# Patient Record
Sex: FEMALE | Race: WHITE | Employment: UNEMPLOYED | URBAN - METROPOLITAN AREA
[De-identification: names, ages, dates, MRNs, and addresses within clinical notes are randomized per-mention and may not be internally consistent; named-entity substitution may affect disease eponyms.]

---

## 2017-01-27 ENCOUNTER — OFFICE VISIT (OUTPATIENT)
Dept: FAMILY MEDICINE CLINIC | Age: 32
End: 2017-01-27

## 2017-01-27 VITALS
SYSTOLIC BLOOD PRESSURE: 126 MMHG | HEIGHT: 63 IN | WEIGHT: 170 LBS | HEART RATE: 86 BPM | BODY MASS INDEX: 30.12 KG/M2 | DIASTOLIC BLOOD PRESSURE: 70 MMHG | TEMPERATURE: 97.9 F | RESPIRATION RATE: 20 BRPM

## 2017-01-27 DIAGNOSIS — L03.213 PERIORBITAL CELLULITIS OF LEFT EYE: Primary | ICD-10-CM

## 2017-01-27 PROCEDURE — 99213 OFFICE O/P EST LOW 20 MIN: CPT | Performed by: FAMILY MEDICINE

## 2017-01-27 RX ORDER — SULFAMETHOXAZOLE AND TRIMETHOPRIM 800; 160 MG/1; MG/1
1 TABLET ORAL 2 TIMES DAILY
Qty: 20 TABLET | Refills: 0 | Status: SHIPPED | OUTPATIENT
Start: 2017-01-27 | End: 2017-02-06

## 2017-01-27 RX ORDER — CEPHALEXIN 500 MG/1
500 CAPSULE ORAL 4 TIMES DAILY
Qty: 40 CAPSULE | Refills: 0 | Status: SHIPPED | OUTPATIENT
Start: 2017-01-27 | End: 2017-02-06

## 2017-01-28 ENCOUNTER — PATIENT MESSAGE (OUTPATIENT)
Dept: FAMILY MEDICINE CLINIC | Age: 32
End: 2017-01-28

## 2017-02-08 ENCOUNTER — TELEPHONE (OUTPATIENT)
Dept: FAMILY MEDICINE CLINIC | Age: 32
End: 2017-02-08

## 2017-03-22 RX ORDER — LEVOTHYROXINE SODIUM 112 UG/1
TABLET ORAL
Qty: 90 TABLET | Refills: 0 | Status: SHIPPED | OUTPATIENT
Start: 2017-03-22 | End: 2017-06-26 | Stop reason: SDUPTHER

## 2017-03-22 RX ORDER — TRIAMTERENE AND HYDROCHLOROTHIAZIDE 37.5; 25 MG/1; MG/1
TABLET ORAL
Qty: 30 TABLET | Refills: 0 | Status: SHIPPED | OUTPATIENT
Start: 2017-03-22 | End: 2017-04-24 | Stop reason: SDUPTHER

## 2017-03-22 RX ORDER — METFORMIN HYDROCHLORIDE 500 MG/1
TABLET, EXTENDED RELEASE ORAL
Qty: 90 TABLET | Refills: 0 | Status: SHIPPED | OUTPATIENT
Start: 2017-03-22 | End: 2017-04-24 | Stop reason: SDUPTHER

## 2017-04-24 ENCOUNTER — TELEPHONE (OUTPATIENT)
Dept: FAMILY MEDICINE CLINIC | Age: 32
End: 2017-04-24

## 2017-04-25 ENCOUNTER — OFFICE VISIT (OUTPATIENT)
Dept: INTERNAL MEDICINE CLINIC | Age: 32
End: 2017-04-25

## 2017-04-25 VITALS
HEIGHT: 63 IN | TEMPERATURE: 98.1 F | SYSTOLIC BLOOD PRESSURE: 125 MMHG | DIASTOLIC BLOOD PRESSURE: 84 MMHG | HEART RATE: 96 BPM | BODY MASS INDEX: 30.83 KG/M2 | RESPIRATION RATE: 16 BRPM | OXYGEN SATURATION: 97 % | WEIGHT: 174 LBS

## 2017-04-25 DIAGNOSIS — J20.9 ACUTE BRONCHITIS, UNSPECIFIED ORGANISM: ICD-10-CM

## 2017-04-25 DIAGNOSIS — R05.9 COUGH: ICD-10-CM

## 2017-04-25 DIAGNOSIS — J02.9 SORE THROAT: Primary | ICD-10-CM

## 2017-04-25 LAB — S PYO AG THROAT QL: NORMAL

## 2017-04-25 PROCEDURE — 99214 OFFICE O/P EST MOD 30 MIN: CPT | Performed by: NURSE PRACTITIONER

## 2017-04-25 PROCEDURE — 87880 STREP A ASSAY W/OPTIC: CPT | Performed by: NURSE PRACTITIONER

## 2017-04-25 RX ORDER — ALBUTEROL SULFATE 90 UG/1
2 AEROSOL, METERED RESPIRATORY (INHALATION) EVERY 6 HOURS PRN
Qty: 1 INHALER | Refills: 0 | Status: SHIPPED | OUTPATIENT
Start: 2017-04-25 | End: 2020-01-25

## 2017-04-25 RX ORDER — TRIAMTERENE AND HYDROCHLOROTHIAZIDE 37.5; 25 MG/1; MG/1
TABLET ORAL
Qty: 30 TABLET | Refills: 3 | Status: SHIPPED | OUTPATIENT
Start: 2017-04-25 | End: 2017-08-23 | Stop reason: SDUPTHER

## 2017-04-25 RX ORDER — DICLOFENAC POTASSIUM 50 MG/1
50 TABLET, FILM COATED ORAL 3 TIMES DAILY
COMMUNITY
End: 2017-06-21

## 2017-04-25 RX ORDER — NAPROXEN SODIUM 550 MG/1
550 TABLET ORAL 2 TIMES DAILY WITH MEALS
COMMUNITY
End: 2017-06-21

## 2017-04-25 RX ORDER — HYDROXYZINE PAMOATE 25 MG/1
25 CAPSULE ORAL 3 TIMES DAILY PRN
COMMUNITY
End: 2020-01-25

## 2017-04-25 RX ORDER — AZITHROMYCIN 250 MG/1
TABLET, FILM COATED ORAL
Qty: 1 PACKET | Refills: 0 | Status: SHIPPED | OUTPATIENT
Start: 2017-04-25 | End: 2017-06-21

## 2017-04-25 RX ORDER — METFORMIN HYDROCHLORIDE 500 MG/1
TABLET, EXTENDED RELEASE ORAL
Qty: 90 TABLET | Refills: 3 | Status: SHIPPED | OUTPATIENT
Start: 2017-04-25 | End: 2017-08-25 | Stop reason: SDUPTHER

## 2017-04-25 RX ORDER — LEVETIRACETAM 250 MG/1
250 TABLET ORAL 2 TIMES DAILY
COMMUNITY
End: 2017-06-21

## 2017-04-25 RX ORDER — DEXTROMETHORPHAN HYDROBROMIDE AND PROMETHAZINE HYDROCHLORIDE 15; 6.25 MG/5ML; MG/5ML
5 SYRUP ORAL 4 TIMES DAILY PRN
Qty: 1 BOTTLE | Refills: 0 | Status: SHIPPED | OUTPATIENT
Start: 2017-04-25 | End: 2017-05-02

## 2017-04-25 RX ORDER — PROMETHAZINE HYDROCHLORIDE 25 MG/1
25 SUPPOSITORY RECTAL EVERY 6 HOURS PRN
COMMUNITY
End: 2020-08-19

## 2017-04-25 ASSESSMENT — ENCOUNTER SYMPTOMS
VOICE CHANGE: 1
VOMITING: 0
SORE THROAT: 1
DIARRHEA: 0
COUGH: 1
NAUSEA: 0

## 2017-06-05 RX ORDER — ERGOCALCIFEROL 1.25 MG/1
CAPSULE ORAL
Qty: 12 CAPSULE | Refills: 0 | Status: SHIPPED | OUTPATIENT
Start: 2017-06-05 | End: 2018-03-13 | Stop reason: SDUPTHER

## 2017-06-21 ENCOUNTER — OFFICE VISIT (OUTPATIENT)
Dept: FAMILY MEDICINE CLINIC | Age: 32
End: 2017-06-21

## 2017-06-21 VITALS
SYSTOLIC BLOOD PRESSURE: 114 MMHG | HEART RATE: 76 BPM | TEMPERATURE: 98.1 F | HEIGHT: 63 IN | WEIGHT: 176 LBS | BODY MASS INDEX: 31.18 KG/M2 | DIASTOLIC BLOOD PRESSURE: 70 MMHG | RESPIRATION RATE: 20 BRPM

## 2017-06-21 DIAGNOSIS — M79.7 FIBROMYALGIA: Primary | ICD-10-CM

## 2017-06-21 DIAGNOSIS — E03.9 ACQUIRED HYPOTHYROIDISM: ICD-10-CM

## 2017-06-21 DIAGNOSIS — I10 ESSENTIAL HYPERTENSION: ICD-10-CM

## 2017-06-21 DIAGNOSIS — E28.2 PCOS (POLYCYSTIC OVARIAN SYNDROME): ICD-10-CM

## 2017-06-21 DIAGNOSIS — M25.50 ARTHRALGIA, UNSPECIFIED JOINT: ICD-10-CM

## 2017-06-21 LAB
C-REACTIVE PROTEIN: 8.4 MG/L (ref 0–5.1)
SEDIMENTATION RATE, ERYTHROCYTE: 20 MM/HR (ref 0–20)
TSH SERPL DL<=0.05 MIU/L-ACNC: 2.61 UIU/ML (ref 0.27–4.2)

## 2017-06-21 PROCEDURE — 99214 OFFICE O/P EST MOD 30 MIN: CPT | Performed by: FAMILY MEDICINE

## 2017-06-21 PROCEDURE — 36415 COLL VENOUS BLD VENIPUNCTURE: CPT | Performed by: FAMILY MEDICINE

## 2017-06-21 RX ORDER — DICLOFENAC POTASSIUM 50 MG/1
50 TABLET, FILM COATED ORAL 2 TIMES DAILY PRN
Qty: 90 TABLET | COMMUNITY
Start: 2017-06-21 | End: 2020-01-25

## 2017-06-21 RX ORDER — LEVETIRACETAM 250 MG/1
250 TABLET ORAL 2 TIMES DAILY PRN
Qty: 60 TABLET | COMMUNITY
Start: 2017-06-21 | End: 2020-01-25 | Stop reason: SINTOL

## 2017-06-23 LAB
ANA INTERPRETATION: ABNORMAL
ANA PATTERN: ABNORMAL
ANA TITER: ABNORMAL
ANTI-NUCLEAR ANTIBODY (ANA): POSITIVE
PATHOLOGIST: ABNORMAL

## 2017-06-26 ENCOUNTER — TELEPHONE (OUTPATIENT)
Dept: FAMILY MEDICINE CLINIC | Age: 32
End: 2017-06-26

## 2017-06-26 RX ORDER — LEVOTHYROXINE SODIUM 112 UG/1
TABLET ORAL
Qty: 90 TABLET | Refills: 0 | Status: SHIPPED | OUTPATIENT
Start: 2017-06-26 | End: 2017-09-20 | Stop reason: SDUPTHER

## 2017-07-10 ENCOUNTER — TELEPHONE (OUTPATIENT)
Dept: INTERNAL MEDICINE CLINIC | Age: 32
End: 2017-07-10

## 2017-07-27 ENCOUNTER — OFFICE VISIT (OUTPATIENT)
Dept: FAMILY MEDICINE CLINIC | Age: 32
End: 2017-07-27

## 2017-07-27 VITALS
RESPIRATION RATE: 12 BRPM | BODY MASS INDEX: 30.82 KG/M2 | TEMPERATURE: 98.2 F | HEART RATE: 103 BPM | SYSTOLIC BLOOD PRESSURE: 110 MMHG | WEIGHT: 174 LBS | DIASTOLIC BLOOD PRESSURE: 70 MMHG

## 2017-07-27 DIAGNOSIS — H69.82 EUSTACHIAN TUBE DYSFUNCTION, LEFT: Primary | ICD-10-CM

## 2017-07-27 PROCEDURE — 99213 OFFICE O/P EST LOW 20 MIN: CPT | Performed by: NURSE PRACTITIONER

## 2017-07-27 RX ORDER — FLUTICASONE PROPIONATE 50 MCG
2 SPRAY, SUSPENSION (ML) NASAL DAILY
Qty: 1 BOTTLE | Refills: 0 | Status: SHIPPED | OUTPATIENT
Start: 2017-07-27 | End: 2020-01-25

## 2017-07-27 ASSESSMENT — ENCOUNTER SYMPTOMS
COUGH: 1
SINUS PRESSURE: 0
SORE THROAT: 1
VOMITING: 1
NAUSEA: 1
SHORTNESS OF BREATH: 0

## 2017-08-17 ENCOUNTER — OFFICE VISIT (OUTPATIENT)
Dept: RHEUMATOLOGY | Age: 32
End: 2017-08-17

## 2017-08-17 VITALS
HEART RATE: 101 BPM | SYSTOLIC BLOOD PRESSURE: 114 MMHG | OXYGEN SATURATION: 96 % | WEIGHT: 167.4 LBS | DIASTOLIC BLOOD PRESSURE: 80 MMHG | HEIGHT: 63 IN | BODY MASS INDEX: 29.66 KG/M2

## 2017-08-17 DIAGNOSIS — R76.8 POSITIVE ANA (ANTINUCLEAR ANTIBODY): ICD-10-CM

## 2017-08-17 DIAGNOSIS — M25.50 POLYARTHRALGIA: ICD-10-CM

## 2017-08-17 DIAGNOSIS — M79.7 FIBROMYALGIA: Primary | ICD-10-CM

## 2017-08-17 DIAGNOSIS — M79.7 FIBROMYALGIA: ICD-10-CM

## 2017-08-17 LAB
A/G RATIO: 1.6 (ref 1.1–2.2)
ALBUMIN SERPL-MCNC: 5 G/DL (ref 3.4–5)
ALP BLD-CCNC: 77 U/L (ref 40–129)
ALT SERPL-CCNC: 101 U/L (ref 10–40)
ANION GAP SERPL CALCULATED.3IONS-SCNC: 17 MMOL/L (ref 3–16)
AST SERPL-CCNC: 135 U/L (ref 15–37)
BASOPHILS ABSOLUTE: 0 K/UL (ref 0–0.2)
BASOPHILS RELATIVE PERCENT: 0.4 %
BILIRUB SERPL-MCNC: 0.6 MG/DL (ref 0–1)
BUN BLDV-MCNC: 9 MG/DL (ref 7–20)
CALCIUM SERPL-MCNC: 10.1 MG/DL (ref 8.3–10.6)
CHLORIDE BLD-SCNC: 96 MMOL/L (ref 99–110)
CO2: 27 MMOL/L (ref 21–32)
CREAT SERPL-MCNC: 0.9 MG/DL (ref 0.6–1.1)
EOSINOPHILS ABSOLUTE: 0.1 K/UL (ref 0–0.6)
EOSINOPHILS RELATIVE PERCENT: 1.9 %
GFR AFRICAN AMERICAN: >60
GFR NON-AFRICAN AMERICAN: >60
GLOBULIN: 3.1 G/DL
GLUCOSE BLD-MCNC: 83 MG/DL (ref 70–99)
HCT VFR BLD CALC: 47.1 % (ref 36–48)
HEMOGLOBIN: 16.2 G/DL (ref 12–16)
HEPATITIS B CORE IGM ANTIBODY: NORMAL
HEPATITIS B SURFACE ANTIGEN INTERPRETATION: NORMAL
HEPATITIS C ANTIBODY INTERPRETATION: NORMAL
LYMPHOCYTES ABSOLUTE: 1.9 K/UL (ref 1–5.1)
LYMPHOCYTES RELATIVE PERCENT: 26 %
MCH RBC QN AUTO: 30.4 PG (ref 26–34)
MCHC RBC AUTO-ENTMCNC: 34.3 G/DL (ref 31–36)
MCV RBC AUTO: 88.5 FL (ref 80–100)
MONOCYTES ABSOLUTE: 0.6 K/UL (ref 0–1.3)
MONOCYTES RELATIVE PERCENT: 8.6 %
NEUTROPHILS ABSOLUTE: 4.6 K/UL (ref 1.7–7.7)
NEUTROPHILS RELATIVE PERCENT: 63.1 %
PDW BLD-RTO: 12.2 % (ref 12.4–15.4)
PLATELET # BLD: 259 K/UL (ref 135–450)
PMV BLD AUTO: 9.2 FL (ref 5–10.5)
POTASSIUM SERPL-SCNC: 3.7 MMOL/L (ref 3.5–5.1)
RBC # BLD: 5.32 M/UL (ref 4–5.2)
RHEUMATOID FACTOR: <10 IU/ML
SODIUM BLD-SCNC: 140 MMOL/L (ref 136–145)
TOTAL CK: 155 U/L (ref 26–192)
TOTAL PROTEIN: 8.1 G/DL (ref 6.4–8.2)
VITAMIN B-12: 700 PG/ML (ref 211–911)
VITAMIN D 25-HYDROXY: 25.4 NG/ML
WBC # BLD: 7.4 K/UL (ref 4–11)

## 2017-08-17 PROCEDURE — 99244 OFF/OP CNSLTJ NEW/EST MOD 40: CPT | Performed by: INTERNAL MEDICINE

## 2017-08-18 DIAGNOSIS — R74.8 ELEVATED LIVER ENZYMES: Primary | ICD-10-CM

## 2017-08-18 DIAGNOSIS — R74.8 ELEVATED LIVER ENZYMES: ICD-10-CM

## 2017-08-18 DIAGNOSIS — M79.7 FIBROMYALGIA: ICD-10-CM

## 2017-08-18 LAB
ALBUMIN SERPL-MCNC: 4.8 G/DL (ref 3.4–5)
ALP BLD-CCNC: 73 U/L (ref 40–129)
ALT SERPL-CCNC: 92 U/L (ref 10–40)
AST SERPL-CCNC: 118 U/L (ref 15–37)
BILIRUB SERPL-MCNC: 0.6 MG/DL (ref 0–1)
BILIRUBIN DIRECT: <0.2 MG/DL (ref 0–0.3)
BILIRUBIN, INDIRECT: ABNORMAL MG/DL (ref 0–1)
ENA TO RNP ANTIBODY: NEGATIVE EU
ENA TO SMITH (SM) ANTIBODY: NEGATIVE EU
ENA TO SSA (RO) ANTIBODY: NEGATIVE EU
ENA TO SSB (LA) ANTIBODY: NEGATIVE EU
TOTAL PROTEIN: 7.9 G/DL (ref 6.4–8.2)

## 2017-08-19 LAB
CCP IGG ANTIBODIES: 5 UNITS (ref 0–19)
DOUBLE STRANDED DNA AB, IGG: NORMAL

## 2017-08-24 RX ORDER — TRIAMTERENE AND HYDROCHLOROTHIAZIDE 37.5; 25 MG/1; MG/1
TABLET ORAL
Qty: 30 TABLET | Refills: 5 | Status: SHIPPED | OUTPATIENT
Start: 2017-08-24 | End: 2018-02-27 | Stop reason: SDUPTHER

## 2017-09-19 ENCOUNTER — OFFICE VISIT (OUTPATIENT)
Dept: GYNECOLOGY | Age: 32
End: 2017-09-19

## 2017-09-19 VITALS
SYSTOLIC BLOOD PRESSURE: 127 MMHG | HEART RATE: 73 BPM | BODY MASS INDEX: 30.48 KG/M2 | DIASTOLIC BLOOD PRESSURE: 83 MMHG | TEMPERATURE: 97.6 F | WEIGHT: 172 LBS | HEIGHT: 63 IN

## 2017-09-19 DIAGNOSIS — Z01.419 WELL WOMAN EXAM WITH ROUTINE GYNECOLOGICAL EXAM: Primary | ICD-10-CM

## 2017-09-19 PROCEDURE — 99395 PREV VISIT EST AGE 18-39: CPT | Performed by: OBSTETRICS & GYNECOLOGY

## 2017-09-20 RX ORDER — LEVOTHYROXINE SODIUM 112 UG/1
TABLET ORAL
Qty: 90 TABLET | Refills: 1 | Status: SHIPPED | OUTPATIENT
Start: 2017-09-20 | End: 2020-01-27

## 2017-09-21 LAB
HPV COMMENT: ABNORMAL
HPV TYPE 16: NOT DETECTED
HPV TYPE 18: NOT DETECTED
HPVOH (OTHER TYPES): DETECTED

## 2017-10-16 ENCOUNTER — TELEPHONE (OUTPATIENT)
Dept: GYNECOLOGY | Age: 32
End: 2017-10-16

## 2017-10-16 NOTE — TELEPHONE ENCOUNTER
Called pt and lvm asking for call back to reschedule her colpo she originally cancelled on the 10th.

## 2017-10-19 ENCOUNTER — TELEPHONE (OUTPATIENT)
Dept: INTERNAL MEDICINE CLINIC | Age: 32
End: 2017-10-19

## 2017-10-19 NOTE — TELEPHONE ENCOUNTER
Pt just called. She is lost at [facility] looking for Dr. Harrison Saeed office. I advised she is on the [facility] side today at her [place] office. She was already almost 10 min late and stated it would take her about 20 min to get to that office. I did reschedule the pt for Tues. At the [facility] office.

## 2017-10-24 ENCOUNTER — OFFICE VISIT (OUTPATIENT)
Dept: RHEUMATOLOGY | Age: 32
End: 2017-10-24

## 2017-10-24 ENCOUNTER — TELEPHONE (OUTPATIENT)
Dept: INTERNAL MEDICINE CLINIC | Age: 32
End: 2017-10-24

## 2017-10-24 VITALS
DIASTOLIC BLOOD PRESSURE: 90 MMHG | HEIGHT: 63 IN | BODY MASS INDEX: 30.16 KG/M2 | WEIGHT: 170.2 LBS | SYSTOLIC BLOOD PRESSURE: 120 MMHG

## 2017-10-24 DIAGNOSIS — M79.7 FIBROMYALGIA: Primary | ICD-10-CM

## 2017-10-24 DIAGNOSIS — M25.50 POLYARTHRALGIA: ICD-10-CM

## 2017-10-24 DIAGNOSIS — R76.8 POSITIVE ANA (ANTINUCLEAR ANTIBODY): ICD-10-CM

## 2017-10-24 DIAGNOSIS — R74.8 ELEVATED LIVER ENZYMES: ICD-10-CM

## 2017-10-24 PROCEDURE — 99214 OFFICE O/P EST MOD 30 MIN: CPT | Performed by: INTERNAL MEDICINE

## 2017-10-24 PROCEDURE — G8484 FLU IMMUNIZE NO ADMIN: HCPCS | Performed by: INTERNAL MEDICINE

## 2017-10-24 PROCEDURE — 1036F TOBACCO NON-USER: CPT | Performed by: INTERNAL MEDICINE

## 2017-10-24 PROCEDURE — G8417 CALC BMI ABV UP PARAM F/U: HCPCS | Performed by: INTERNAL MEDICINE

## 2017-10-24 PROCEDURE — G8427 DOCREV CUR MEDS BY ELIG CLIN: HCPCS | Performed by: INTERNAL MEDICINE

## 2017-10-24 NOTE — PROGRESS NOTES
Vomiting     Projectile vomiting    Peanut-Containing Drug Products Anaphylaxis    Peanuts [Peanut Oil] Anaphylaxis    Macrobid [Nitrofurantoin Monohydrate Macrocrystals] Hives         Comments  No specialty comments available. Background history:  Amy Murillo is a 28 y.o. female who is being seen for follow up evaluation of  fibromyalgia, polyarthralgia and positive ESTUARDO. She is diagnosed with fibromyalgia for past many years. She is complaining of chronic widespread musculoskeletal pain involving upper and lower body on both sides of the midline. She describes her pain as constant, 7 out of 10, aching with no relieving or aggravating factors. She has tried multiple medications including gabapentin, Celebrex, Cymbalta, Elavil, Flexeril, tizanidine with no improvement. She is now on Lyrica 150 mg twice a day with mild improvement in symptoms. One and a half months ago she was started on Savella 12.5 mg twice a day but has not noticed much improvement in symptoms. She has also tried massage therapy, physical therapy, acupuncture and chiropractor in the past with no improvement in symptoms. Massage therapy made her symptoms worse. She also has a chronic headache and follows a neurologist at [place]. She has received trigger point injections for headache. She is also complaining of pain in the joints mainly in her hands which swell on and off. She also has a stiffness. She has persistent fatigue, insomnia, brain fog and memory loss. Other comorbidities include chronic migraine headaches and depression. She had a blood work by PCP which showed positive ESTUARDO 1:160 homogeneous pattern, slightly elevated CRP of 8.4. She has a significant family history of rheumatoid arthritis in her mother and grandmother. She has facial flushing, dry mouth, raynaud's, heart burn.   She denies any history of malar rash, photosensitivity, nasal or oral ulcers, dry eyes, pleurisy or pericarditis, alopecia, pregnancy complications, miscarriages, blood clots, renal diseases    Interim history: She presents for follow-up of fibromyalgia and polyarthralgia. She continues to be symptomatic. She is on Lyrica 150 mg twice a day and Savella 50 g once a day. She was not able to join the aquatic therapy, she is looking for the places that can take her insurance. She continues to have pain in the joints. She has intermittent swelling. Workup is negative for rheumatoid arthritis. She has elevated liver enzymes. She went to see Dr. Juve Hsu who ordered some blood work and recommended liver biopsy. HPI  ROS    REVIEW OF SYSTEMS:  positive for fatigue, raynauds, dry mouth, ringing in ears, chest pain, shortness of breath, persistent diarrhea, nausea, sexual difficulties, dizziness, muscle spasm, memory loss, night sweats, insomnia, swollen glands, increased susceptibility to infection, heart murmurs  Constitutional: No unanticipated weight loss or fevers. Integumentary: No rash, photosensitivity, malar rash, livedo reticularis, alopecia and sclerodactyly, skin tightening  Eyes: negative for dry eyes, visual disturbance and persistent redness, discharge from eyes   ENT: - No tinnitus, loss of hearing, vertigo, or recurrent ear infections.  - No history of nasal/oral ulcers. Cardiovascular: No history of pericarditis, palpitations  Respiratory: No cough or history of interstitial lung disease. No history of pleurisy. No history of tuberculosis or atypical infections. Gastrointestinal: No history of dysphagia or esophageal dysmotility. No inflammatory bowel disease. Genitourinary: No history renal disease, miscarriages. Hematologic/Lymphatic: No abnormal bruising or bleeding, blood clots or swollen lymph nodes. Neurological: No history seizure or focal weakness.  No history of neuropathies, paresthesias or hyperesthesias, facial droop, diplopia  Psychiatric: No history of bipolar disease, anxiety  Endocrine: Denies any thyroid / Tender Tender    Costochondral  + +   Low cervical + +   suboccipital + +   Trapezius  + +   Supraspinatus  + +   Lateral epicondyl + +   Gluteal + +   Greater trochanter  + +   knees + 0      Tenderness to palpation in multiple PIP, MCP joints  Ambulates without assistance, normal gait  Neck: Full ROM, no tenderness, supple   Upper Extremities        Shoulder: Full ROM, no crepitus        Elbow:  Full ROM, no synovitis, no deformity        Wrist: Full ROM, no synovitis, no deformity, no ulnar deviation        Fingers: No sclerodactly, no active raynaud's, no ulcers. MCPs: No synovitis, no deformity             PIPs:  No synovitis, no deformity             DIPs: No synovitis, no deformity             Nails: No pitting, no telangiectasias. Lower Extremities        Hip: Full ROM, no tenderness to palpation        Knee: Full ROM, no erythema/swelling/laxity/crepitus. Patella not ballotable. Ankle: Full ROM, no swelling or erythema        MTPs: No swelling or erythema  Back- no tenderness. Eyes:  PERRL, extra ocular movements intact, conjunctiva normal   HEENT:  Atraumatic, normocephalic, external ears normal, oropharynx moist, no pharyngeal exudates. Respiratory:  No respiratory distress  GI:  Soft, nondistended, normal bowel sounds, nontender, no organomegaly, no mass, no rebound, no guarding   :  No costovertebral angle tenderness   Integument:  Well hydrated, no rash or telangiectasias, facial flushing ++  Lymphatic:  No lymphadenopathy noted   Neurologic:   Alert & oriented x 3, CN 2-12 normal, no focal deficits noted. Sensations Intact. Muscle strength 5/5 proximally and distally in upper and lower extremities.    Psychiatric:  Speech and behavior appropriate           LABS AND IMAGING  Outside data reviewed and in HPI    Lab Results   Component Value Date    WBC 7.4 08/17/2017    RBC 5.32 08/17/2017    HGB 16.2 08/17/2017    HCT 47.1 08/17/2017     08/17/2017    MCV 88.5 08/17/2017    MCH 30.4 08/17/2017    MCHC 34.3 08/17/2017    RDW 12.2 08/17/2017    LYMPHOPCT 26.0 08/17/2017    MONOPCT 8.6 08/17/2017    BASOPCT 0.4 08/17/2017    MONOSABS 0.6 08/17/2017    LYMPHSABS 1.9 08/17/2017    EOSABS 0.1 08/17/2017    BASOSABS 0.0 08/17/2017       Chemistry        Component Value Date/Time     08/17/2017 1443    K 3.7 08/17/2017 1443    CL 96 (L) 08/17/2017 1443    CO2 27 08/17/2017 1443    BUN 9 08/17/2017 1443    CREATININE 0.9 08/17/2017 1443        Component Value Date/Time    CALCIUM 10.1 08/17/2017 1443    ALKPHOS 73 08/18/2017 1618     (H) 08/18/2017 1618    ALT 92 (H) 08/18/2017 1618    BILITOT 0.6 08/18/2017 1618          Lab Results   Component Value Date    SEDRATE 20 06/21/2017     Lab Results   Component Value Date    CRP 8.4 (H) 06/21/2017     Lab Results   Component Value Date    ESTUARDO POSITIVE 06/21/2017    C3 135 08/10/2015    C4 19 08/10/2015     Lab Results   Component Value Date    RF <10.0 08/17/2017    CCPABIGG 5 08/17/2017     Lab Results   Component Value Date    ESTUARDO POSITIVE 06/21/2017    ANATITER 1:160 06/21/2017    ANAINT see below 06/21/2017    ARELIS Henderson MD,PhD 06/21/2017     Lab Results   Component Value Date    DSDNAG None Detected 08/17/2017     Lab Results   Component Value Date    SSAROAB Negative 08/17/2017    SSALAAB Negative 08/17/2017     Lab Results   Component Value Date    SMAB Negative 08/17/2017    RNPAB Negative 08/17/2017     No results found for: CENTABIGG  Lab Results   Component Value Date    C3 135 08/10/2015    C4 19 08/10/2015     Lab Results   Component Value Date    VITD25 25.4 08/17/2017    FTY83ZOWTZ 1 08/10/2015     ASSESSMENT AND PLAN      Assessment/Plan:      ASSESSMENT:    1. Fibromyalgia    2. Polyarthralgia    3. Positive ESTUARDO (antinuclear antibody)    4. Elevated liver enzymes        PLAN:   Igor Birmingham was seen today for follow-up.     Diagnoses and all orders for this visit:    Carl rFances

## 2017-10-24 NOTE — PROGRESS NOTES
Vomiting     Projectile vomiting    Peanut-Containing Drug Products Anaphylaxis    Peanuts [Peanut Oil] Anaphylaxis    Macrobid [Nitrofurantoin Monohydrate Macrocrystals] Hives         Comments  No specialty comments available. HISTORY OF PRESENT ILLNESS  Chris Wells is a 28 y.o. female who is being seen for follow up evaluation of  fibromyalgia, polyarthralgia and positive ESTUARDO. She is diagnosed with fibromyalgia for past many years. She is complaining of chronic widespread musculoskeletal pain involving upper and lower body on both sides of the midline. She describes her pain as constant, 7 out of 10, aching with no relieving or aggravating factors. She has tried multiple medications including gabapentin, Celebrex, Cymbalta, Elavil, Flexeril, tizanidine with no improvement. She is now on Lyrica 150 mg twice a day with mild improvement in symptoms. One and a half months ago she was started on Savella 12.5 mg twice a day but has not noticed much improvement in symptoms. She has also tried massage therapy, physical therapy, acupuncture and chiropractor in the past with no improvement in symptoms. Massage therapy made her symptoms worse. She also has a chronic headache and follows a neurologist at [place]. She has received trigger point injections for headache. She is also complaining of pain in the joints mainly in her hands which swell on and off. She also has a stiffness. She has persistent fatigue, insomnia, brain fog and memory loss. Other comorbidities include chronic migraine headaches and depression. She had a blood work by PCP which showed positive ESTUARDO 1:160 homogeneous pattern, slightly elevated CRP of 8.4. She has a significant family history of rheumatoid arthritis in her mother and grandmother. She has facial flushing, dry mouth, raynaud's, heart burn.   She denies any history of malar rash, photosensitivity, nasal or oral ulcers, dry eyes, pleurisy or pericarditis, alopecia, (supraventricular tachycardia) ([facility] [address]     Thyroid disease     UTI (urinary tract infection)     as a child     Past Surgical History:   Procedure Laterality Date     SECTION  2009    twins    CHOLECYSTECTOMY  16    Laparoscopic cholecystectomy.  COLPOSCOPY      Dr Gabriela Barcenas- mild dysplasia ([facility] I)    SHOULDER ARTHROSCOPY      right (labral tear)    SHOULDER SURGERY      capsular repair    TONSILLECTOMY AND ADENOIDECTOMY      TUBAL LIGATION  2009     Social History     Social History    Marital status:      Spouse name: N/A    Number of children: N/A    Years of education: N/A     Occupational History    Not on file.      Social History Main Topics    Smoking status: Never Smoker    Smokeless tobacco: Never Used      Comment: congratulated on nonsmoking status    Alcohol use No    Drug use: No    Sexual activity: Yes     Partners: Male     Other Topics Concern    Not on file     Social History Narrative    No narrative on file     Family History   Problem Relation Age of Onset    Other Mother      RA, joint disease    High Blood Pressure Father     High Cholesterol Father          PHYSICAL EXAM   Vitals:    10/24/17 0914   BP: (!) 120/90   Weight: 170 lb 3.2 oz (77.2 kg)   Height: 5' 3\" (1.6 m)     Physical Exam  Constitutional:  Well developed, well nourished, no acute distress, non-toxic appearance   Musculoskeletal:                                           Right            Left                                   Tender Tender    Costochondral  + +   Low cervical + +   suboccipital + +   Trapezius  + +   Supraspinatus  + +   Lateral epicondyl + +   Gluteal + +   Greater trochanter  + +   knees + 0      Tenderness to palpation in multiple PIP, MCP joints  Ambulates without assistance, normal gait  Neck: Full ROM, no tenderness, supple   Upper Extremities        Shoulder: Full ROM, no crepitus        Elbow:  Full ROM, no synovitis, no deformity        Wrist: Full ROM, no synovitis, no deformity, no ulnar deviation        Fingers: No sclerodactly, no active raynaud's, no ulcers. MCPs: No synovitis, no deformity             PIPs:  No synovitis, no deformity             DIPs: No synovitis, no deformity             Nails: No pitting, no telangiectasias. Lower Extremities        Hip: Full ROM, no tenderness to palpation        Knee: Full ROM, no erythema/swelling/laxity/crepitus. Patella not ballotable. Ankle: Full ROM, no swelling or erythema        MTPs: No swelling or erythema  Back- no tenderness. Eyes:  PERRL, extra ocular movements intact, conjunctiva normal   HEENT:  Atraumatic, normocephalic, external ears normal, oropharynx moist, no pharyngeal exudates. Respiratory:  No respiratory distress  GI:  Soft, nondistended, normal bowel sounds, nontender, no organomegaly, no mass, no rebound, no guarding   :  No costovertebral angle tenderness   Integument:  Well hydrated, no rash or telangiectasias, facial flushing ++  Lymphatic:  No lymphadenopathy noted   Neurologic:   Alert & oriented x 3, CN 2-12 normal, no focal deficits noted. Sensations Intact. Muscle strength 5/5 proximally and distally in upper and lower extremities.    Psychiatric:  Speech and behavior appropriate           LABS AND IMAGING  Outside data reviewed and in HPI    Lab Results   Component Value Date    WBC 7.4 08/17/2017    RBC 5.32 08/17/2017    HGB 16.2 08/17/2017    HCT 47.1 08/17/2017     08/17/2017    MCV 88.5 08/17/2017    MCH 30.4 08/17/2017    MCHC 34.3 08/17/2017    RDW 12.2 08/17/2017    LYMPHOPCT 26.0 08/17/2017    MONOPCT 8.6 08/17/2017    BASOPCT 0.4 08/17/2017    MONOSABS 0.6 08/17/2017    LYMPHSABS 1.9 08/17/2017    EOSABS 0.1 08/17/2017    BASOSABS 0.0 08/17/2017       Chemistry        Component Value Date/Time     08/17/2017 1443    K 3.7 08/17/2017 1443    CL 96 (L) 08/17/2017 1443    CO2 27 08/17/2017 1443    BUN 9 08/17/2017 1443    CREATININE 0.9 management and physical therapy/aquatic therapy as well as self exercises. Went over various FDA approved (cymbalta, lyrica, gabapentin, sevalla) and non-fda approved medications (muscle relaxants, ssri's) with the patient. Written material was provided to the patient on fibromyalgia.   - Failed gabapentin, Cymbalta, Elavil, Flexeril, Zanaflex in the past  -Currently on Lyrica 150 mg twice a day, will continue  -Increase Savella to 50 mg twice a day  -Low-impact aerobic and stretching exercises including yoga, anabell chi, swimming, biking, walking  -Counseled on Sleep hygiene   -Drink 64 oz of water and limit caffeine intake to 8 oz/day   -I will check labs to rule out fibromyalgia mimics  -I will refer to aquatic therapy  -     CBC Auto Differential; Future  -     Comprehensive Metabolic Panel; Future  -     CK; Future  -     Vitamin B12; Future  -     Vitamin D 25 Hydroxy; Future  -     milnacipran HCl (SAVELLA) 25 MG TABS; Take 1 tablet by mouth 2 times daily  -     milnacipran HCl (SAVELLA) 50 MG TABS; Take 1 tablet by mouth daily  -     PT aquatic therapy; Future    Positive ESTUARDO (antinuclear antibody)-Implications of low titer positive ESTUARDO were discussed with patient. About 15-20% of normal healthy individuals at her age may have low titer positive ESTUARDO of unclear clinical significance.  -No evidence of connective tissue disease from history and physical  -I will check other serologies to complete the workup and continue to monitor periodically    -     Anti-DNA Antibody, Double-Stranded; Future  -     Lina 1 - anti smith & anti RNP; Future  -     LINA 2 - Anti SSA & Anti SSB; Future    Polyarthralgia- no synovitis in exam.  Pain most likely secondary to fibromyalgia/osteoarthritis. I will check labs to completely rule out rheumatoid arthritis. -     Cyclic Citrul Peptide Antibody, IgG; Future  -     Rheumatoid Factor; Future  -     Hepatitis B Core Antibody, IgM;  Future  -     Hepatitis B Surface Antigen; Future  -     Hepatitis C Antibody; Future     Time spent with the patient >60  minutes and 80% of the time spent with counseling on diagnosis and management, physical conditioning, sleep hygiene    The patient indicates understanding of these issues and agrees with the plan. No Follow-up on file. The risks and benefits of my recommendations, as well as other treatment options, benefits and side effects were discussed with the patient. All questions were answered. I reviewed patient's history, referral documents and electronic medical records  Copy of consult note is being routed electronically/faxed to referring physician         ######################################################################    I thank you for giving me the opportunity to participate in [address] [place] [address]. If you have any questions or concerns please feel free to contact me. I look forward to following  Celestina Saldaña along with you. Electronically signed by: Juni London MD, 10/24/2017 9:50 AM    Documentation was done using voice recognition dragon software. Every effort was made to ensure accuracy; however, inadvertent unintentional computerized transcription errors may be present.

## 2017-10-24 NOTE — TELEPHONE ENCOUNTER
Pharm calling-The savella also comes 50mg      Rx was for 25mg two twice a day #60    So if you want the 25mg the quantity will need to be increased.     Please advise

## 2017-10-24 NOTE — PATIENT INSTRUCTIONS
Increase savella to 50 mg twice a day   Aquatic therapy   Strongly emphasized on low impact aerobic and stretching exercises including biking, swimming, walking, yoga or tiachi classes    Counseled on Sleep hygiene    Advised to drink 64 oz of water and limit caffeine intake to 8 oz/day   -

## 2017-11-03 ENCOUNTER — HOSPITAL ENCOUNTER (OUTPATIENT)
Dept: CT IMAGING | Age: 32
Discharge: HOME | End: 2017-11-03
Attending: INTERNAL MEDICINE | Admitting: INTERNAL MEDICINE

## 2017-11-03 VITALS
RESPIRATION RATE: 16 BRPM | HEIGHT: 63 IN | SYSTOLIC BLOOD PRESSURE: 109 MMHG | BODY MASS INDEX: 29.23 KG/M2 | DIASTOLIC BLOOD PRESSURE: 67 MMHG | HEART RATE: 99 BPM | OXYGEN SATURATION: 96 % | TEMPERATURE: 96.9 F | WEIGHT: 165 LBS

## 2017-11-03 DIAGNOSIS — R74.8 ABNORMAL LEVELS OF OTHER SERUM ENZYMES: ICD-10-CM

## 2017-11-03 DIAGNOSIS — R74.8 ELEVATED LIVER ENZYMES: ICD-10-CM

## 2017-11-03 LAB
INR BLD: 1.11 (ref 0.85–1.15)
PLATELET # BLD: 222 K/UL (ref 135–450)
PROTHROMBIN TIME: 12.5 SEC (ref 9.6–13)

## 2017-11-03 RX ORDER — ONDANSETRON 2 MG/ML
4 INJECTION INTRAMUSCULAR; INTRAVENOUS EVERY 6 HOURS PRN
Status: DISCONTINUED | OUTPATIENT
Start: 2017-11-03 | End: 2017-11-04 | Stop reason: HOSPADM

## 2017-11-03 RX ORDER — ACETAMINOPHEN 325 MG/1
650 TABLET ORAL EVERY 4 HOURS PRN
Status: DISCONTINUED | OUTPATIENT
Start: 2017-11-03 | End: 2017-11-03 | Stop reason: ALTCHOICE

## 2017-11-03 RX ORDER — MIDAZOLAM HYDROCHLORIDE 1 MG/ML
INJECTION INTRAMUSCULAR; INTRAVENOUS DAILY PRN
Status: COMPLETED | OUTPATIENT
Start: 2017-11-03 | End: 2017-11-03

## 2017-11-03 RX ORDER — FENTANYL CITRATE 50 UG/ML
INJECTION, SOLUTION INTRAMUSCULAR; INTRAVENOUS DAILY PRN
Status: COMPLETED | OUTPATIENT
Start: 2017-11-03 | End: 2017-11-03

## 2017-11-03 RX ORDER — OXYCODONE HYDROCHLORIDE AND ACETAMINOPHEN 5; 325 MG/1; MG/1
2 TABLET ORAL EVERY 4 HOURS PRN
Status: DISCONTINUED | OUTPATIENT
Start: 2017-11-03 | End: 2017-11-04 | Stop reason: HOSPADM

## 2017-11-03 RX ORDER — ONDANSETRON 2 MG/ML
4 INJECTION INTRAMUSCULAR; INTRAVENOUS EVERY 8 HOURS PRN
Status: DISCONTINUED | OUTPATIENT
Start: 2017-11-03 | End: 2017-11-04 | Stop reason: HOSPADM

## 2017-11-03 RX ADMIN — ONDANSETRON 4 MG: 2 INJECTION INTRAMUSCULAR; INTRAVENOUS at 10:32

## 2017-11-03 RX ADMIN — FENTANYL CITRATE 100 MCG: 50 INJECTION, SOLUTION INTRAMUSCULAR; INTRAVENOUS at 10:10

## 2017-11-03 RX ADMIN — MIDAZOLAM HYDROCHLORIDE 2 MG: 1 INJECTION INTRAMUSCULAR; INTRAVENOUS at 10:10

## 2017-11-03 RX ADMIN — OXYCODONE HYDROCHLORIDE AND ACETAMINOPHEN 2 TABLET: 5; 325 TABLET ORAL at 11:02

## 2017-11-03 ASSESSMENT — PAIN SCALES - GENERAL
PAINLEVEL_OUTOF10: 9
PAINLEVEL_OUTOF10: 9

## 2017-11-03 ASSESSMENT — PAIN - FUNCTIONAL ASSESSMENT: PAIN_FUNCTIONAL_ASSESSMENT: 0-10

## 2017-11-03 NOTE — PROGRESS NOTES
Pt to phase II. Awake and alert. Grimacing in pain, rates 9/10. Provided snack prior to pain pill administration. VSS. Dressing with scant shadow drainage.

## 2017-11-03 NOTE — PRE SEDATION
Sedation Pre-Procedure Note    Patient Name: Author Santa   YOB: 1985  Room/Bed: Room/bed info not found  Medical Record Number: [id number]  Date: 11/3/2017   Time: 10:01 AM       Indication:  Liver bx    Consent: I have discussed with the patient and/or the patient representative the indication, alternatives, and the possible risks and/or complications of the planned procedure and the anesthesia methods. The patient and/or patient representative appear to understand and agree to proceed. Vital Signs:   Vitals:    17 0912   BP: (!) 143/86   Pulse: 93   Resp: 14   Temp: 98.1 °F (36.7 °C)   SpO2: 100%       Past Medical History:   has a past medical history of Depression; Fibromyalgia; Heart murmur; Hypertension; Insomnia; Migraine; Palpitations; Polycystic disease, ovaries; SVT (supraventricular tachycardia) ([facility] [address] Thyroid disease; and UTI (urinary tract infection). Past Surgical History:   has a past surgical history that includes Shoulder arthroscopy ();  section (); Tonsillectomy and adenoidectomy; shoulder surgery (); Tubal ligation (); Cholecystectomy (16); and Colposcopy (6/3/16). Medications:   Scheduled Meds:   Continuous Infusions:   PRN Meds:   Home Meds:   Prior to Admission medications    Medication Sig Start Date End Date Taking?  Authorizing Provider   milnacipran HCl (SAVELLA) 50 MG TABS Take 1 tablet by mouth 2 times daily 10/24/17  Yes Malgorzata Orosco MD   levothyroxine (SYNTHROID) 112 MCG tablet TAKE ONE TABLET BY MOUTH ONCE DAILY 17  Yes Tyson Corrales MD   triamterene-hydrochlorothiazide [facility]) 37.5-25 MG per tablet TAKE ONE TABLET BY MOUTH ONCE DAILY 17  Yes Tyson Corrales MD   LYRICA 150 MG capsule TAKE ONE CAPSULE BY MOUTH TWICE DAILY 17  Yes Tyson Corrales MD   diclofenac (CATAFLAM) 50 MG tablet Take 1 tablet by mouth 2 times daily as needed for Pain 17  Yes Tyson Corrales MD

## 2017-11-03 NOTE — PROGRESS NOTES
Pt feeling better. VSS. Pt states she is ready to go home, Discharge instructions provided, verbalized understanding.

## 2017-11-03 NOTE — PROGRESS NOTES
established preoperatively. 23.  The patient/caregiver demonstrates knowledge of the expected responses to the operative or invasive procedure. 20.  Patient/Caregiver has reduced anxiety. Interventions-Familiarize with environment and equipment.   21.  Other:  22.  Other:

## 2017-11-03 NOTE — PROGRESS NOTES
1. Identify name and date of birth. 2.  Patient remains free from signs and symptoms of injury. 3.  Patient receives appropriate medication(s), safely administered during the       Perioperative period. 4.  The patient is free from signs and symptoms of infection. 5.  The patient has wound/tissur perfusion. 6.  The patient's fluid, electrolyte, and acid-base balances are established perioperatively. 7.  The patient's pulmonary function is established preoperatively. 8.  The patient's cardiovascular status is established perioperatively. 9.  The patient/caregiver demonstrates knowledge of nutritional management related to the operative or other invasive procedure. 10. The patient/caregiver demonstrates knowledge of medication management. 11. The patient/caregiver demonstrates knowledge of pain management. 12.  The patient participates in the rehabilitation process as applicable. 13.  The patient/caregiver participates in decisions in decisions affecting his or her Perioperative plan of care. 14.  The patients care is consistent with the individualized Perioperative plan of care. 15.  The patients right to privacy is maintained. 16.  The patient is the recipient of competent and ethical care within legal standards of practice. 17.  The patient's value system, lifestyle, ethnicity, and culture are considered, respected, and incorporated int the perioperative plan of care and understands special services available. 18.  The patient demonstrates and/or reports adequate pain control throughout the perioperative period. 19. The patient's neurological status is established preoperatively. 20. The patient/caregiver demonstrates knowledge of the expected responses to the operative or invasive procedure. 21.  Patient/caregiver has reduced anxiety. Interventions - Familiarize with environment and equipment. 22.  Patient/caregiver verbalizes understanding of Phase I and Phase II process.   23.  Patient pain level is established preoperatively using age appropriate pain scale.

## 2017-12-22 ENCOUNTER — OFFICE VISIT (OUTPATIENT)
Dept: FAMILY MEDICINE CLINIC | Age: 32
End: 2017-12-22

## 2017-12-22 VITALS
RESPIRATION RATE: 12 BRPM | DIASTOLIC BLOOD PRESSURE: 82 MMHG | SYSTOLIC BLOOD PRESSURE: 126 MMHG | TEMPERATURE: 98.2 F | HEART RATE: 94 BPM | WEIGHT: 167 LBS | BODY MASS INDEX: 29.58 KG/M2

## 2017-12-22 DIAGNOSIS — I10 ESSENTIAL HYPERTENSION: ICD-10-CM

## 2017-12-22 DIAGNOSIS — E03.9 ACQUIRED HYPOTHYROIDISM: ICD-10-CM

## 2017-12-22 DIAGNOSIS — Z13.220 SCREENING, LIPID: ICD-10-CM

## 2017-12-22 DIAGNOSIS — K75.81 NASH (NONALCOHOLIC STEATOHEPATITIS): ICD-10-CM

## 2017-12-22 DIAGNOSIS — M79.7 FIBROMYALGIA: Primary | ICD-10-CM

## 2017-12-22 LAB
A/G RATIO: 1.6 (ref 1.1–2.2)
ALBUMIN SERPL-MCNC: 4.5 G/DL (ref 3.4–5)
ALP BLD-CCNC: 67 U/L (ref 40–129)
ALT SERPL-CCNC: 25 U/L (ref 10–40)
ANION GAP SERPL CALCULATED.3IONS-SCNC: 14 MMOL/L (ref 3–16)
AST SERPL-CCNC: 35 U/L (ref 15–37)
BILIRUB SERPL-MCNC: 0.6 MG/DL (ref 0–1)
BUN BLDV-MCNC: 12 MG/DL (ref 7–20)
CALCIUM SERPL-MCNC: 9.7 MG/DL (ref 8.3–10.6)
CHLORIDE BLD-SCNC: 99 MMOL/L (ref 99–110)
CHOLESTEROL, TOTAL: 186 MG/DL (ref 0–199)
CO2: 29 MMOL/L (ref 21–32)
CREAT SERPL-MCNC: 0.8 MG/DL (ref 0.6–1.1)
GFR AFRICAN AMERICAN: >60
GFR NON-AFRICAN AMERICAN: >60
GLOBULIN: 2.9 G/DL
GLUCOSE BLD-MCNC: 73 MG/DL (ref 70–99)
HDLC SERPL-MCNC: 48 MG/DL (ref 40–60)
LDL CHOLESTEROL CALCULATED: 113 MG/DL
POTASSIUM SERPL-SCNC: 3.6 MMOL/L (ref 3.5–5.1)
SODIUM BLD-SCNC: 142 MMOL/L (ref 136–145)
TOTAL PROTEIN: 7.4 G/DL (ref 6.4–8.2)
TRIGL SERPL-MCNC: 124 MG/DL (ref 0–150)
TSH SERPL DL<=0.05 MIU/L-ACNC: 0.38 UIU/ML (ref 0.27–4.2)
VLDLC SERPL CALC-MCNC: 25 MG/DL

## 2017-12-22 PROCEDURE — G8417 CALC BMI ABV UP PARAM F/U: HCPCS | Performed by: FAMILY MEDICINE

## 2017-12-22 PROCEDURE — G8427 DOCREV CUR MEDS BY ELIG CLIN: HCPCS | Performed by: FAMILY MEDICINE

## 2017-12-22 PROCEDURE — 1036F TOBACCO NON-USER: CPT | Performed by: FAMILY MEDICINE

## 2017-12-22 PROCEDURE — G8484 FLU IMMUNIZE NO ADMIN: HCPCS | Performed by: FAMILY MEDICINE

## 2017-12-22 PROCEDURE — 99214 OFFICE O/P EST MOD 30 MIN: CPT | Performed by: FAMILY MEDICINE

## 2017-12-22 NOTE — PROGRESS NOTES
Subjective:      Patient ID: Kojo Short is a 28 y.o. female. Blood pressure 126/82, pulse 94, temperature 98.2 °F (36.8 °C), temperature source Oral, resp. rate 12, weight 167 lb (75.8 kg), not currently breastfeeding. HPI  Here for f/u HTN, other things. Recently dx with CANSECO by Dr Sharlene Hamilton. On vit e. Working on diet/exercise. FM: saw dr Eusebia Celestin. On Savella, higher dose now. Does feel a bit better. She continues to have a 'racoon rash' but has had neg autoimmune work up. She still has suspicion, as does Dr Sharlene Hamilton, of autoimmune disease. Also on Lyrica. Still has bouts of fatigue, soreness, though. Worse in cold weather. Sees neurology: Dr Jose Angel Martin for migrianes. Getting botox injections. On keppra and prn diclofenac/phenergan. Also using vistaril nightly for prevention. Sees Dr Elvis Cook for sleep at [facility]. On cpap. He has discouraged her form pursuing UP3 surgery. Hypothyroid: on synthroid 112. Feels OK on this. Except for being tired. Thinks she feels better when her THS is lower, though. Lab Results   Component Value Date    TSH 2.61 06/21/2017      HTN: on triamterine/hctz. On this for several years. No hx of any vascular events. Has normal renal function   Lab Results   Component Value Date     08/17/2017    K 3.7 08/17/2017    BUN 9 08/17/2017    CREATININE 0.9 08/17/2017        She is fasting for lipids today. Patient Active Problem List   Diagnosis    Fibromyalgia    Insomnia    TBI (traumatic brain injury) ([facility] [address]    Hypothyroid- autoimmune; 3/15.  Post concussion syndrome    Post-concussion vertigo    Vitamin D deficiency-13ng/mL (3/24/15)    JAIMEE (obstructive sleep apnea)- on CPAP since 4/15 Dr Elvis Cook at [facility] hypertension    Chronic post-traumatic headache, not intractable    PCOS (polycystic ovarian syndrome) based on oligomenorrhea and high androgens    Cervical dysplasia, mild- colpo/bx 6/3/16      Body mass index is 29.58 kg/m².     Wt Jas Mckeon MD)  Documentation: No signs of potential drug abuse or diversion identified. Luis Eduardo Shafer MD)  Medication Contracts: Existing medication contract. Luis Eduardo Shafer MD)     2. Acquired hypothyroidism  - with symptoms of fatigue: recheck TSH level.  - TSH without Reflex; Future    3. Essential hypertension  - Blood pressure is at goal on current therapy; continue meds and monitoring. Regular physical activity and healthy diet (low sodium, multiple servings of produce daily) was recommended. Renal function to be assessed yearly.   - COMPREHENSIVE METABOLIC PANEL; Future    4. CANSECO (nonalcoholic steatohepatitis)- Dr Los Noriega on Vit E  - discussed need for dietary changes and weight loss, based on theory of hyperinsulinemia as a major driving force of steatohepatitis. 5. Screening, lipid  - Lipid Panel;  Future          Plan:      F/u 6 mo

## 2018-01-02 RX ORDER — METFORMIN HYDROCHLORIDE 500 MG/1
TABLET, EXTENDED RELEASE ORAL
Qty: 90 TABLET | Refills: 5 | Status: SHIPPED | OUTPATIENT
Start: 2018-01-02 | End: 2020-01-25

## 2018-01-04 RX ORDER — NITROGLYCERIN 0.4 MG/1
TABLET SUBLINGUAL
Qty: 25 TABLET | Refills: 3 | Status: SHIPPED | OUTPATIENT
Start: 2018-01-04 | End: 2020-01-25

## 2018-01-24 ENCOUNTER — OFFICE VISIT (OUTPATIENT)
Dept: RHEUMATOLOGY | Age: 33
End: 2018-01-24

## 2018-01-24 VITALS
SYSTOLIC BLOOD PRESSURE: 110 MMHG | WEIGHT: 172.4 LBS | DIASTOLIC BLOOD PRESSURE: 80 MMHG | HEIGHT: 63 IN | BODY MASS INDEX: 30.55 KG/M2

## 2018-01-24 DIAGNOSIS — M25.50 POLYARTHRALGIA: ICD-10-CM

## 2018-01-24 DIAGNOSIS — M79.7 FIBROMYALGIA: Primary | ICD-10-CM

## 2018-01-24 DIAGNOSIS — R74.8 ELEVATED LIVER ENZYMES: ICD-10-CM

## 2018-01-24 DIAGNOSIS — R76.8 POSITIVE ANA (ANTINUCLEAR ANTIBODY): ICD-10-CM

## 2018-01-24 PROCEDURE — 99214 OFFICE O/P EST MOD 30 MIN: CPT | Performed by: INTERNAL MEDICINE

## 2018-01-24 PROCEDURE — G8484 FLU IMMUNIZE NO ADMIN: HCPCS | Performed by: INTERNAL MEDICINE

## 2018-01-24 PROCEDURE — G8427 DOCREV CUR MEDS BY ELIG CLIN: HCPCS | Performed by: INTERNAL MEDICINE

## 2018-01-24 PROCEDURE — G8417 CALC BMI ABV UP PARAM F/U: HCPCS | Performed by: INTERNAL MEDICINE

## 2018-01-24 PROCEDURE — 1036F TOBACCO NON-USER: CPT | Performed by: INTERNAL MEDICINE

## 2018-01-24 RX ORDER — CYCLOBENZAPRINE HCL 5 MG
TABLET ORAL
Qty: 90 TABLET | Refills: 1 | Status: SHIPPED | OUTPATIENT
Start: 2018-01-24 | End: 2020-01-25

## 2018-01-24 NOTE — PATIENT INSTRUCTIONS
- cyclobenzaprine (FLEXERIL) 5 MG tablet; Take 1 or 2 tabs at night. Can take 1 tab in am if tolerated  Dispense: 90 tablet;  Refill: 1  -Continue lyrica and savella   -massage   Counseling

## 2018-01-24 NOTE — PROGRESS NOTES
2018  Patient Name: John Bauman  : 1985  Medical Record: [id number]    MEDICATIONS  Current Outpatient Prescriptions   Medication Sig Dispense Refill    cyclobenzaprine (FLEXERIL) 5 MG tablet Take 1 or 2 tabs at night. Can take 1 tab in am if tolerated 90 tablet 1    nitroGLYCERIN (NITROSTAT) 0.4 MG SL tablet PLACE ONE TABLET UNDER THE TONGUE ONCE DAILY AS NEEDED FOR CHEST PAIN.  MAY REPEAT IN 5 MINUTES IF NEEDED 25 tablet 3    metFORMIN (GLUCOPHAGE-XR) 500 MG extended release tablet TAKE THREE TABLETS BY MOUTH AT BEDTIME 90 tablet 5    LYRICA 150 MG capsule TAKE ONE CAPSULE BY MOUTH TWICE DAILY 60 capsule 2    milnacipran HCl (SAVELLA) 50 MG TABS Take 1 tablet by mouth 2 times daily 60 tablet 5    levothyroxine (SYNTHROID) 112 MCG tablet TAKE ONE TABLET BY MOUTH ONCE DAILY 90 tablet 1    triamterene-hydrochlorothiazide (MAXZIDE-25) 37.5-25 MG per tablet TAKE ONE TABLET BY MOUTH ONCE DAILY 30 tablet 5    fluticasone (FLONASE) 50 MCG/ACT nasal spray 2 sprays by Nasal route daily 1 Bottle 0    diclofenac (CATAFLAM) 50 MG tablet Take 1 tablet by mouth 2 times daily as needed for Pain 90 tablet     levETIRAcetam (KEPPRA) 250 MG tablet Take 1 tablet by mouth 2 times daily as needed 60 tablet     vitamin D (ERGOCALCIFEROL) 96001 UNITS CAPS capsule TAKE ONE CAPSULE BY MOUTH ONCE A WEEK 12 capsule 0    hydrOXYzine (VISTARIL) 25 MG capsule Take 25 mg by mouth 3 times daily as needed for Itching      promethazine (PHENERGAN) 25 MG suppository Place 25 mg rectally every 6 hours as needed for Nausea      albuterol sulfate HFA (PROVENTIL HFA) 108 (90 BASE) MCG/ACT inhaler Inhale 2 puffs into the lungs every 6 hours as needed for Wheezing 1 Inhaler 0    Ascorbic Acid (VITAMIN C) 250 MG CHEW Take by mouth 2 times daily      ferrous sulfate 325 (65 FE) MG tablet Take 325 mg by mouth 2 times daily (before meals)      Riboflavin (VITAMIN B-2 PO) Take 4 tablets by mouth every evening      EPINEPHrine (EPIPEN 2-NYLA) 0.3 MG/0.3ML SOAJ injection Inject 0.3 mLs into the muscle once as needed (anaphylaxis) 1 each 1     No current facility-administered medications for this visit. ALLERGIES  Allergies   Allergen Reactions    Hydrocodone-Acetaminophen Nausea And Vomiting     Projectile vomiting    Peanut-Containing Drug Products Anaphylaxis    Peanuts [Peanut Oil] Anaphylaxis    Macrobid [Nitrofurantoin Monohydrate Macrocrystals] Hives         Comments  No specialty comments available. Background history:  Merrick Byers is a 28 y.o. female who is being seen for follow up evaluation of  fibromyalgia, polyarthralgia and positive ESTUARDO. She is diagnosed with fibromyalgia for past many years. She is complaining of chronic widespread musculoskeletal pain involving upper and lower body on both sides of the midline. She describes her pain as constant, 7 out of 10, aching with no relieving or aggravating factors. She has tried multiple medications including gabapentin, Celebrex, Cymbalta, Elavil, Flexeril, tizanidine with no improvement. She is now on Lyrica 150 mg twice a day with mild improvement in symptoms. One and a half months ago she was started on Savella 12.5 mg twice a day but has not noticed much improvement in symptoms. She has also tried massage therapy, physical therapy, acupuncture and chiropractor in the past with no improvement in symptoms. Massage therapy made her symptoms worse. She also has a chronic headache and follows a neurologist at [facility]. She has received trigger point injections for headache. She is also complaining of pain in the joints mainly in her hands which swell on and off. She also has a stiffness. She has persistent fatigue, insomnia, brain fog and memory loss. Other comorbidities include chronic migraine headaches and depression. She had a blood work by PCP which showed positive ESTUARDO 1:160 homogeneous pattern, slightly elevated CRP of 8.4.   She has a significant family history of rheumatoid arthritis in her mother and grandmother. She has facial flushing, dry mouth, raynaud's, heart burn. She denies any history of malar rash, photosensitivity, nasal or oral ulcers, dry eyes, pleurisy or pericarditis, alopecia, pregnancy complications, miscarriages, blood clots, renal diseases    Interim history: She presents for follow-up of fibromyalgia and polyarthralgia. She continues to be symptomatic. She is on Lyrica 150 mg twice a day and Savella 50 Mg twice a day. She was not able to join the aquatic therapy, she is looking for the places that can take her insurance. She is doing yoga and Pilates almost every day. She continues to have pain in the joints. She has intermittent swelling. Workup is negative for rheumatoid arthritis. She has elevated liver enzymes. She went to see Dr. Griffin Haas she had liver biopsy which showed fatty liver. HPI  ROS    REVIEW OF SYSTEMS:  positive for fatigue, raynauds, dry mouth, ringing in ears, chest pain, shortness of breath, persistent diarrhea, nausea, sexual difficulties, dizziness, muscle spasm, memory loss, night sweats, insomnia, swollen glands, increased susceptibility to infection, heart murmurs  Constitutional: No unanticipated weight loss or fevers. Integumentary: No rash, photosensitivity, malar rash, livedo reticularis, alopecia and sclerodactyly, skin tightening  Eyes: negative for dry eyes, visual disturbance and persistent redness, discharge from eyes   ENT: - No tinnitus, loss of hearing, vertigo, or recurrent ear infections.  - No history of nasal/oral ulcers. Cardiovascular: No history of pericarditis, palpitations  Respiratory: No cough or history of interstitial lung disease. No history of pleurisy. No history of tuberculosis or atypical infections. Gastrointestinal: No history of dysphagia or esophageal dysmotility. No inflammatory bowel disease.   Genitourinary: No history renal disease, Cholesterol Father          PHYSICAL EXAM   Vitals:    01/24/18 1519   BP: 110/80   Weight: 172 lb 6.4 oz (78.2 kg)   Height: 5' 3\" (1.6 m)     Physical Exam  Constitutional:  Well developed, well nourished, no acute distress, non-toxic appearance   Musculoskeletal:                                           Right            Left                                   Tender Tender    Costochondral  + +   Low cervical + +   suboccipital + +   Trapezius  + +   Supraspinatus  + +   Lateral epicondyl + +   Gluteal + +   Greater trochanter  + +   knees + 0      Tenderness to palpation in multiple PIP, MCP joints  Ambulates without assistance, normal gait  Neck: Full ROM, no tenderness, supple   Upper Extremities        Shoulder: Full ROM, no crepitus        Elbow:  Full ROM, no synovitis, no deformity        Wrist: Full ROM, no synovitis, no deformity, no ulnar deviation        Fingers: No sclerodactly, no active raynaud's, no ulcers. MCPs: No synovitis, no deformity             PIPs:  No synovitis, no deformity             DIPs: No synovitis, no deformity             Nails: No pitting, no telangiectasias. Lower Extremities        Hip: Full ROM, no tenderness to palpation        Knee: Full ROM, no erythema/swelling/laxity/crepitus. Patella not ballotable. Ankle: Full ROM, no swelling or erythema        MTPs: No swelling or erythema  Back- no tenderness. Eyes:  PERRL, extra ocular movements intact, conjunctiva normal   HEENT:  Atraumatic, normocephalic, external ears normal, oropharynx moist, no pharyngeal exudates. Respiratory:  No respiratory distress  GI:  Soft, nondistended, normal bowel sounds, nontender, no organomegaly, no mass, no rebound, no guarding   :  No costovertebral angle tenderness   Integument:  Well hydrated, no rash or telangiectasias, facial flushing ++  Lymphatic:  No lymphadenopathy noted   Neurologic:   Alert & oriented x 3, CN 2-12 normal, no focal deficits noted.  Sensations 08/17/2017    [id number] 1 08/10/2015     ASSESSMENT AND PLAN      Assessment/Plan:      ASSESSMENT:    1. Fibromyalgia    2. Polyarthralgia    3. Positive ESTUARDO (antinuclear antibody)    4. Elevated liver enzymes        PLAN:   1. Fibromyalgia  Continues to be symptomatic  -Continue Lyrica 150 mg twice a day, Savella 50 mg  twice a day  -Referred to massage therapy   - continue yoga, low impact aerobic exercises  -Start Flexeril, side effects were discussed including excessive sedation, drowsiness, dry mouth. She was advised not to drive while taking Flexeril  - cyclobenzaprine (FLEXERIL) 5 MG tablet; Take 1 or 2 tabs at night. Can take 1 tab in am if tolerated  Dispense: 90 tablet; Refill: 6 - [id number]    2. Polyarthralgia  Symptoms most likely secondary to fibromyalgia/chronic pain. Workup negative for rheumatoid arthritis. 3. Positive ESTUARDO (antinuclear antibody)  Low titer positive ESTUARDO. Other serologies negative    4. Elevated liver enzymes  Liver biopsy consistent with fatty liver. Weight loss    Time spent with the patient 25 minutes and half of the time spent with counseling/coordination of care    The patient indicates understanding of these issues and agrees with the plan. Return in about 6 months (around 7/24/2018). The risks and benefits of my recommendations, as well as other treatment options, benefits and side effects were discussed with the patient. All questions were answered. ######################################################################    I thank you for giving me the opportunity to participate in [address] [place] [address]. If you have any questions or concerns please feel free to contact me. I look forward to following  Mac Ask along with you. Electronically signed by: Oziel Burgess MD, 1/24/2018 3:53 PM    Documentation was done using voice recognition dragon software.   Every effort was made to ensure accuracy; however, inadvertent

## 2018-01-29 ENCOUNTER — TELEPHONE (OUTPATIENT)
Dept: GYNECOLOGY | Age: 33
End: 2018-01-29

## 2018-02-05 ENCOUNTER — TELEPHONE (OUTPATIENT)
Dept: GYNECOLOGY | Age: 33
End: 2018-02-05

## 2018-02-27 RX ORDER — TRIAMTERENE AND HYDROCHLOROTHIAZIDE 37.5; 25 MG/1; MG/1
TABLET ORAL
Qty: 30 TABLET | Refills: 5 | Status: SHIPPED | OUTPATIENT
Start: 2018-02-27 | End: 2020-01-25

## 2018-02-28 ENCOUNTER — PATIENT MESSAGE (OUTPATIENT)
Dept: FAMILY MEDICINE CLINIC | Age: 33
End: 2018-02-28

## 2018-02-28 DIAGNOSIS — G89.29 CHRONIC NONINTRACTABLE HEADACHE, UNSPECIFIED HEADACHE TYPE: ICD-10-CM

## 2018-02-28 DIAGNOSIS — R51.9 CHRONIC NONINTRACTABLE HEADACHE, UNSPECIFIED HEADACHE TYPE: ICD-10-CM

## 2018-02-28 DIAGNOSIS — I10 ESSENTIAL HYPERTENSION: ICD-10-CM

## 2018-02-28 DIAGNOSIS — R61 EXCESSIVE SWEATING: Primary | ICD-10-CM

## 2018-03-08 DIAGNOSIS — G89.29 CHRONIC NONINTRACTABLE HEADACHE, UNSPECIFIED HEADACHE TYPE: ICD-10-CM

## 2018-03-08 DIAGNOSIS — R61 EXCESSIVE SWEATING: ICD-10-CM

## 2018-03-08 DIAGNOSIS — I10 ESSENTIAL HYPERTENSION: ICD-10-CM

## 2018-03-08 DIAGNOSIS — R51.9 CHRONIC NONINTRACTABLE HEADACHE, UNSPECIFIED HEADACHE TYPE: ICD-10-CM

## 2018-03-13 RX ORDER — ERGOCALCIFEROL 1.25 MG/1
CAPSULE ORAL
Qty: 12 CAPSULE | Refills: 1 | Status: SHIPPED | OUTPATIENT
Start: 2018-03-13 | End: 2020-01-25

## 2018-04-02 RX ORDER — LEVOTHYROXINE SODIUM 112 UG/1
TABLET ORAL
Qty: 30 TABLET | Refills: 5 | Status: SHIPPED | OUTPATIENT
Start: 2018-04-02 | End: 2020-01-25

## 2018-04-02 RX ORDER — EPINEPHRINE 0.3 MG/.3ML
INJECTION SUBCUTANEOUS
Qty: 2 EACH | Refills: 1 | Status: SHIPPED | OUTPATIENT
Start: 2018-04-02 | End: 2020-01-25

## 2018-05-29 ENCOUNTER — TELEPHONE (OUTPATIENT)
Dept: FAMILY MEDICINE CLINIC | Age: 33
End: 2018-05-29

## 2020-01-25 ENCOUNTER — OFFICE VISIT (OUTPATIENT)
Dept: FAMILY MEDICINE CLINIC | Age: 35
End: 2020-01-25
Payer: COMMERCIAL

## 2020-01-25 VITALS
HEIGHT: 63 IN | SYSTOLIC BLOOD PRESSURE: 110 MMHG | WEIGHT: 160 LBS | HEART RATE: 81 BPM | OXYGEN SATURATION: 98 % | BODY MASS INDEX: 28.35 KG/M2 | DIASTOLIC BLOOD PRESSURE: 76 MMHG

## 2020-01-25 LAB
A/G RATIO: 1.6 (ref 1.1–2.2)
ALBUMIN SERPL-MCNC: 4.4 G/DL (ref 3.4–5)
ALP BLD-CCNC: 51 U/L (ref 40–129)
ALT SERPL-CCNC: 10 U/L (ref 10–40)
ANION GAP SERPL CALCULATED.3IONS-SCNC: 10 MMOL/L (ref 3–16)
AST SERPL-CCNC: 16 U/L (ref 15–37)
BILIRUB SERPL-MCNC: 0.4 MG/DL (ref 0–1)
BUN BLDV-MCNC: 9 MG/DL (ref 7–20)
CALCIUM SERPL-MCNC: 9.5 MG/DL (ref 8.3–10.6)
CHLORIDE BLD-SCNC: 101 MMOL/L (ref 99–110)
CHOLESTEROL, TOTAL: 163 MG/DL (ref 0–199)
CO2: 27 MMOL/L (ref 21–32)
CREAT SERPL-MCNC: 0.8 MG/DL (ref 0.6–1.1)
GFR AFRICAN AMERICAN: >60
GFR NON-AFRICAN AMERICAN: >60
GLOBULIN: 2.7 G/DL
GLUCOSE BLD-MCNC: 95 MG/DL (ref 70–99)
HDLC SERPL-MCNC: 56 MG/DL (ref 40–60)
LDL CHOLESTEROL CALCULATED: 91 MG/DL
POTASSIUM SERPL-SCNC: 4.4 MMOL/L (ref 3.5–5.1)
SODIUM BLD-SCNC: 138 MMOL/L (ref 136–145)
TOTAL PROTEIN: 7.1 G/DL (ref 6.4–8.2)
TRIGL SERPL-MCNC: 80 MG/DL (ref 0–150)
TSH SERPL DL<=0.05 MIU/L-ACNC: 2.58 UIU/ML (ref 0.27–4.2)
VLDLC SERPL CALC-MCNC: 16 MG/DL

## 2020-01-25 PROCEDURE — 1036F TOBACCO NON-USER: CPT | Performed by: FAMILY MEDICINE

## 2020-01-25 PROCEDURE — G8484 FLU IMMUNIZE NO ADMIN: HCPCS | Performed by: FAMILY MEDICINE

## 2020-01-25 PROCEDURE — 36415 COLL VENOUS BLD VENIPUNCTURE: CPT | Performed by: FAMILY MEDICINE

## 2020-01-25 PROCEDURE — 99215 OFFICE O/P EST HI 40 MIN: CPT | Performed by: FAMILY MEDICINE

## 2020-01-25 PROCEDURE — G8427 DOCREV CUR MEDS BY ELIG CLIN: HCPCS | Performed by: FAMILY MEDICINE

## 2020-01-25 PROCEDURE — G8419 CALC BMI OUT NRM PARAM NOF/U: HCPCS | Performed by: FAMILY MEDICINE

## 2020-01-25 RX ORDER — EPINEPHRINE 0.3 MG/.3ML
0.3 INJECTION SUBCUTANEOUS
Qty: 1 EACH | Refills: 1 | Status: SHIPPED | OUTPATIENT
Start: 2020-01-25 | End: 2021-01-09 | Stop reason: SDUPTHER

## 2020-01-25 RX ORDER — ZOLPIDEM TARTRATE 10 MG/1
10 TABLET ORAL NIGHTLY PRN
Qty: 30 TABLET | Refills: 2 | Status: SHIPPED | OUTPATIENT
Start: 2020-01-25 | End: 2020-04-22

## 2020-01-25 RX ORDER — HYDROXYZINE PAMOATE 25 MG/1
25 CAPSULE ORAL NIGHTLY
Qty: 30 CAPSULE | Refills: 3 | Status: SHIPPED | OUTPATIENT
Start: 2020-01-25 | End: 2020-05-26

## 2020-01-25 ASSESSMENT — PATIENT HEALTH QUESTIONNAIRE - PHQ9
SUM OF ALL RESPONSES TO PHQ QUESTIONS 1-9: 1
1. LITTLE INTEREST OR PLEASURE IN DOING THINGS: 0
2. FEELING DOWN, DEPRESSED OR HOPELESS: 1
SUM OF ALL RESPONSES TO PHQ9 QUESTIONS 1 & 2: 1
SUM OF ALL RESPONSES TO PHQ QUESTIONS 1-9: 1

## 2020-01-25 NOTE — PROGRESS NOTES
over-extended, taking time for rest.  Sleep is 'not good' meaning that she has difficulty falling and staying asleep. Is not using CPAP. Is stored away somewhere and is currently lost.  It was helpful. She wishes to re-connect with Dr Ashleigh Bond. ~4-5 hrs of sleep nightly. Was on zolpidem 7.5 mg prn while in [facility]. Used it about 3x a week or less. She would never have migraines after using ambien and sleeping more soundly. Synthroid dose currently 100 mcg. Last TSH unknown. Patient Active Problem List   Diagnosis    Fibromyalgia    Insomnia    TBI (traumatic brain injury) ([facility] [address]    Hypothyroid- autoimmune; 3/15.  Vitamin D deficiency-13ng/mL (3/24/15)    JAIMEE (obstructive sleep apnea)- on CPAP since 4/15 Dr Ashleigh Bond at [place] [id number] hypertension    Chronic post-traumatic headache, not intractable    PCOS (polycystic ovarian syndrome) based on oligomenorrhea and high androgens    Cervical dysplasia, mild- colpo/bx 6/3/16    CANSECO (nonalcoholic steatohepatitis)- Dr Lambert Saliva on Vit E      Body mass index is 28.34 kg/m².     Wt Readings from Last 3 Encounters:   01/25/20 160 lb (72.6 kg)   01/24/18 172 lb 6.4 oz (78.2 kg)   12/22/17 167 lb (75.8 kg)      BP Readings from Last 3 Encounters:   01/25/20 110/76   01/24/18 110/80   12/22/17 126/82      Current Outpatient Medications   Medication Sig Dispense Refill    levothyroxine (SYNTHROID) 112 MCG tablet TAKE ONE TABLET BY MOUTH ONCE DAILY 90 tablet 1    levETIRAcetam (KEPPRA) 250 MG tablet Take 1 tablet by mouth 2 times daily as needed 60 tablet     promethazine (PHENERGAN) 25 MG suppository Place 25 mg rectally every 6 hours as needed for Nausea      Ascorbic Acid (VITAMIN C) 250 MG CHEW Take by mouth 2 times daily      Riboflavin (VITAMIN B-2 PO) Take 4 tablets by mouth every evening      hydrOXYzine (VISTARIL) 25 MG capsule Take 25 mg by mouth 3 times daily as needed for Itching       No current facility-administered medications for this post-traumatic headache, not intractable  - discussed headache prevention- starting verapail, use vistaril and ambien nightly to promote good sleep nightly. (offered and discussed CBT with therapist also for future consideration). She will also contact Dr Christian Trevino for resumption of CPAP. - thought to be migraines also, but avoid triptans due to prior hypertensive emergency. 5. Primary insomnia  - As above. Will have her use ambien nightly for migraine and headache prevention  - zolpidem (AMBIEN) 10 MG tablet; Take 1 tablet by mouth nightly as needed for Sleep for up to 90 days. Dispense: 30 tablet; Refill: 2    6. Fibromyalgia  - no current meds for this, except to promote better sleep    7. Screening, lipid  - Lipid Panel          Plan:      Fu 2 mo due to bp med change.         Garrison Saldaña MD

## 2020-01-27 RX ORDER — LEVOTHYROXINE SODIUM 0.1 MG/1
100 TABLET ORAL DAILY
Qty: 90 TABLET | Refills: 3 | Status: SHIPPED | OUTPATIENT
Start: 2020-01-27 | End: 2021-05-04

## 2020-02-08 ENCOUNTER — PATIENT MESSAGE (OUTPATIENT)
Dept: FAMILY MEDICINE CLINIC | Age: 35
End: 2020-02-08

## 2020-02-10 RX ORDER — PENICILLIN V POTASSIUM 500 MG/1
500 TABLET ORAL 4 TIMES DAILY
Qty: 40 TABLET | Refills: 0 | Status: SHIPPED | OUTPATIENT
Start: 2020-02-10 | End: 2020-02-20

## 2020-05-01 ENCOUNTER — VIRTUAL VISIT (OUTPATIENT)
Dept: FAMILY MEDICINE CLINIC | Age: 35
End: 2020-05-01
Payer: COMMERCIAL

## 2020-05-01 PROCEDURE — G8427 DOCREV CUR MEDS BY ELIG CLIN: HCPCS | Performed by: FAMILY MEDICINE

## 2020-05-01 PROCEDURE — 99214 OFFICE O/P EST MOD 30 MIN: CPT | Performed by: FAMILY MEDICINE

## 2020-05-01 RX ORDER — SUMATRIPTAN 100 MG/1
100 TABLET, FILM COATED ORAL
Qty: 9 TABLET | Refills: 5 | Status: SHIPPED | OUTPATIENT
Start: 2020-05-01 | End: 2020-05-01

## 2020-05-01 NOTE — PROGRESS NOTES
Prior to Visit Medications    Medication Sig Taking? Authorizing Provider   verapamil (CALAN SR) 180 MG extended release tablet Take 1 tablet by mouth nightly Yes Chano Mojica MD   SUMAtriptan (IMITREX) 100 MG tablet Take 1 tablet by mouth once as needed for Migraine Yes Chano Mojica MD   zolpidem (AMBIEN) 10 MG tablet TAKE 1 TABLET BY MOUTH NIGHTLY AS NEEDED FOR SLEEP Yes Chano Mojica MD   levothyroxine (SYNTHROID) 100 MCG tablet Take 1 tablet by mouth Daily Yes Chano Mojica MD   EPINEPHrine (EPIPEN 2-NYLA) 0.3 MG/0.3ML SOAJ injection Inject 0.3 mLs into the muscle once as needed (anaphylaxis) Yes Chano Mojica MD   hydrOXYzine (VISTARIL) 25 MG capsule Take 1 capsule by mouth nightly Yes Chano Mojica MD   promethazine (PHENERGAN) 25 MG suppository Place 25 mg rectally every 6 hours as needed for Nausea Yes Historical Provider, MD   Ascorbic Acid (VITAMIN C) 250 MG CHEW Take by mouth 2 times daily Yes Historical Provider, MD   Riboflavin (VITAMIN B-2 PO) Take 4 tablets by mouth every evening Yes Historical Provider, MD       Social History     Tobacco Use    Smoking status: Never Smoker    Smokeless tobacco: Never Used    Tobacco comment: congratulated on nonsmoking status   Substance Use Topics    Alcohol use: No     Alcohol/week: 0.0 standard drinks    Drug use: No            PHYSICAL EXAMINATION:  Physical Exam  Constitutional:       General: She is not in acute distress. Appearance: Normal appearance. She is not ill-appearing. Pulmonary:      Effort: Pulmonary effort is normal.   Skin:     General: Skin is warm. Neurological:      General: No focal deficit present. Mental Status: She is alert and oriented to person, place, and time. Mental status is at baseline. Psychiatric:         Mood and Affect: Mood normal.         Behavior: Behavior normal.         Thought Content:  Thought content normal.         Judgment: Judgment normal. substitute for in-person clinic visit. Patient and provider were located at their individual homes. --Sarahi Mcclellan MD on 5/1/2020 at 2:47 PM    An electronic signature was used to authenticate this note.

## 2020-05-01 NOTE — LETTER
[facility] [address] [address]  Phone: [phone]  Fax: [phone]    Bhargav Painter MD        May 1, 2020     Patient: Hudson Swain   YOB: 1985   Date of Visit: 5/1/2020       To Whom It May Concern: It is my medical opinion that Darek Green does not tolerate prolonged mask wearing due to underlying medical problems. If you have any questions or concerns, please don't hesitate to call.     Sincerely,        Bhargav Painter MD

## 2020-05-06 ENCOUNTER — TELEPHONE (OUTPATIENT)
Dept: FAMILY MEDICINE CLINIC | Age: 35
End: 2020-05-06

## 2020-05-26 RX ORDER — HYDROXYZINE PAMOATE 25 MG/1
25 CAPSULE ORAL NIGHTLY
Qty: 30 CAPSULE | Refills: 3 | Status: SHIPPED | OUTPATIENT
Start: 2020-05-26 | End: 2020-10-05

## 2020-08-12 ENCOUNTER — TELEPHONE (OUTPATIENT)
Dept: FAMILY MEDICINE CLINIC | Age: 35
End: 2020-08-12

## 2020-08-12 NOTE — TELEPHONE ENCOUNTER
Pt was exposed to someone that is positive for covid yesterday the pt is not experiencing no symptoms but is staying in for 14 days and is working from home   Pt is scheduled for a vv next week FYI   Pt wanted hr  to know

## 2020-08-19 ENCOUNTER — TELEMEDICINE (OUTPATIENT)
Dept: FAMILY MEDICINE CLINIC | Age: 35
End: 2020-08-19
Payer: COMMERCIAL

## 2020-08-19 PROCEDURE — G8427 DOCREV CUR MEDS BY ELIG CLIN: HCPCS | Performed by: FAMILY MEDICINE

## 2020-08-19 PROCEDURE — 99214 OFFICE O/P EST MOD 30 MIN: CPT | Performed by: FAMILY MEDICINE

## 2020-08-19 RX ORDER — ONDANSETRON 8 MG/1
8 TABLET, ORALLY DISINTEGRATING ORAL EVERY 8 HOURS PRN
Qty: 30 TABLET | Refills: 1 | Status: SHIPPED | OUTPATIENT
Start: 2020-08-19

## 2020-08-19 RX ORDER — VERAPAMIL HYDROCHLORIDE 240 MG/1
240 TABLET, FILM COATED, EXTENDED RELEASE ORAL NIGHTLY
Qty: 30 TABLET | Refills: 3 | Status: SHIPPED | OUTPATIENT
Start: 2020-08-19 | End: 2020-12-28 | Stop reason: ALTCHOICE

## 2020-08-19 NOTE — PROGRESS NOTES
2020    TELEHEALTH EVALUATION -- Audio/Visual (During SLFEX-74 public health emergency)    HPI:    Jewel Flores (:  1985) has requested an audio/video evaluation for the following concern(s):    Chief Complaint   Patient presents with    Other     exposed to covid - sent home 20 from work       Juan Conroy says she had a close contact at work with someone who tested positive. She is currently asymptomatic. The exposure was masked, close proximity (<6 feet) but not more than 10 min. Another co-worker had exposure and symptoms but tested negative. She does report having stuffy nose, slight sore throat and loose stools the past 3 days. No fever or cough. Is in [place], [place] currently. HTN: taking veramapil 180. Denies SE's. No constipation. Also used for migraine prophylaxis. Home bps: (done at work) 185/96, but that was not a resting bp. Has noted higher bp's like this the past 3 weeks. BP Readings from Last 3 Encounters:   20 110/76   18 110/80   17 126/82      Current migraine headache frequency: ~2/week still. Imitrex does work pretty well. But causes nausea. Hypothyroid: on synthroid 100. Last TSH stable. Feels OK on this. Lab Results   Component Value Date    TSH 2.58 2020    TSH 0.38 2017    TSH 2.61 2017        Review of Systems No chest pains, dizziness, heart palpitations, dyspnea, lightheadedness, worsening edema. Patient Active Problem List   Diagnosis    Fibromyalgia    Insomnia    TBI (traumatic brain injury) ([facility] [address]    Hypothyroid- autoimmune; 3/15.     Vitamin D deficiency-13ng/mL (3/24/15)    JAIMEE (obstructive sleep apnea)- on CPAP since 4/15 Dr Marion Kennedy at [facility] 21 hypertension    Chronic post-traumatic headache, not intractable    PCOS (polycystic ovarian syndrome) based on oligomenorrhea and high androgens    Cervical dysplasia, mild- colpo/bx 6/3/16    CANSECO (nonalcoholic steatohepatitis)- Dr Nick Hannon on Vit E Prior to Visit Medications    Medication Sig Taking? Authorizing Provider   zolpidem (AMBIEN) 10 MG tablet TAKE 1 TABLET BY MOUTH NIGHTLY AS NEEDED FOR SLEEP  Annika Riddle MD   hydrOXYzine (VISTARIL) 25 MG capsule TAKE 1 CAPSULE BY MOUTH NIGHTLY  Annika Riddle MD   verapamil (CALAN SR) 180 MG extended release tablet Take 1 tablet by mouth nightly  Annika Riddle MD   SUMAtriptan (IMITREX) 100 MG tablet Take 1 tablet by mouth once as needed for Migraine  Annika Riddle MD   levothyroxine (SYNTHROID) 100 MCG tablet Take 1 tablet by mouth Daily  Annika Riddle MD   EPINEPHrine (EPIPEN 2-NYLA) 0.3 MG/0.3ML SOAJ injection Inject 0.3 mLs into the muscle once as needed (anaphylaxis)  Annika Riddle MD   Ascorbic Acid (VITAMIN C) 250 MG CHEW Take by mouth 2 times daily  Historical Provider, MD   Riboflavin (VITAMIN B-2 PO) Take 4 tablets by mouth every evening  Historical Provider, MD       Social History     Tobacco Use    Smoking status: Never Smoker    Smokeless tobacco: Never Used    Tobacco comment: congratulated on nonsmoking status   Substance Use Topics    Alcohol use: No     Alcohol/week: 0.0 standard drinks    Drug use: No            PHYSICAL EXAMINATION:  Physical Exam  Constitutional:       General: She is not in acute distress. Appearance: Normal appearance. She is not ill-appearing. Pulmonary:      Effort: Pulmonary effort is normal.   Skin:     General: Skin is warm. Neurological:      General: No focal deficit present. Mental Status: She is alert and oriented to person, place, and time. Mental status is at baseline. Psychiatric:         Mood and Affect: Mood normal.         Behavior: Behavior normal.         Thought Content: Thought content normal.         Judgment: Judgment normal.          ASSESSMENT/PLAN:    1. Essential hypertension  Home bp's high and migraines still not well controlled. Will increase verapamil to 240.   Recheck in 6 weeks in office. 2. Close exposure to COVID-19 virus  - advised to go get covid test done. Her mild sx could very well indicate a current infection. 3. Intractable chronic migraine without aura and without status migrainosus  - incr verapamil to 240.  - continue nightly ambien for sleep (she needs the 10 mg dose)  - add zofran to take 20 min prior to imitrex to help prevent emesis. Return in about 6 weeks (around 9/30/2020) for follow up Hypertension, in office visit. Damari Robison is a 29 y.o. female being evaluated by a Virtual Visit (video visit) encounter to address concerns as mentioned above. A caregiver was present when appropriate. Due to this being a TeleHealth encounter (During [facility] public health emergency), evaluation of the following organ systems was limited: Vitals/Constitutional/EENT/Resp/CV/GI//MS/Neuro/Skin/Heme-Lymph-Imm. Pursuant to the emergency declaration under the [address] [place] [address], [address] [place] [address] authority and the [employer] and [employer] Act, this Virtual Visit was conducted with patient's (and/or legal guardian's) consent, to reduce the patient's risk of exposure to COVID-19 and provide necessary medical care. The patient (and/or legal guardian) has also been advised to contact this office for worsening conditions or problems, and seek emergency medical treatment and/or call 911 if deemed necessary. Patient identification was verified at the start of the visit: Yes    Total time spent on this encounter: 21 or more minutes were spent on the digital evaluation and management of this patient. Services were provided through a video synchronous discussion virtually to substitute for in-person clinic visit. Patient and provider were located at their individual homes. --Saul Cruz MD on 8/19/2020 at 8:54 AM    An electronic signature was used to authenticate this note.

## 2020-09-21 RX ORDER — METHOCARBAMOL 500 MG/1
500 TABLET, FILM COATED ORAL 4 TIMES DAILY
Qty: 40 TABLET | Refills: 0 | Status: SHIPPED | OUTPATIENT
Start: 2020-09-21 | End: 2020-10-01

## 2020-10-05 RX ORDER — HYDROXYZINE PAMOATE 25 MG/1
25 CAPSULE ORAL NIGHTLY
Qty: 30 CAPSULE | Refills: 2 | Status: SHIPPED | OUTPATIENT
Start: 2020-10-05 | End: 2020-10-09 | Stop reason: SDUPTHER

## 2020-10-09 ENCOUNTER — OFFICE VISIT (OUTPATIENT)
Dept: FAMILY MEDICINE CLINIC | Age: 35
End: 2020-10-09
Payer: COMMERCIAL

## 2020-10-09 VITALS
OXYGEN SATURATION: 98 % | SYSTOLIC BLOOD PRESSURE: 138 MMHG | DIASTOLIC BLOOD PRESSURE: 92 MMHG | BODY MASS INDEX: 30.65 KG/M2 | HEART RATE: 64 BPM | WEIGHT: 173 LBS | HEIGHT: 63 IN | TEMPERATURE: 98.2 F

## 2020-10-09 DIAGNOSIS — I10 ESSENTIAL HYPERTENSION: ICD-10-CM

## 2020-10-09 LAB
ANION GAP SERPL CALCULATED.3IONS-SCNC: 15 MMOL/L (ref 3–16)
BUN BLDV-MCNC: 11 MG/DL (ref 7–20)
CALCIUM SERPL-MCNC: 9.7 MG/DL (ref 8.3–10.6)
CHLORIDE BLD-SCNC: 104 MMOL/L (ref 99–110)
CO2: 23 MMOL/L (ref 21–32)
CREAT SERPL-MCNC: 0.8 MG/DL (ref 0.6–1.1)
GFR AFRICAN AMERICAN: >60
GFR NON-AFRICAN AMERICAN: >60
GLUCOSE BLD-MCNC: 87 MG/DL (ref 70–99)
POTASSIUM SERPL-SCNC: 4 MMOL/L (ref 3.5–5.1)
SODIUM BLD-SCNC: 142 MMOL/L (ref 136–145)

## 2020-10-09 PROCEDURE — G8417 CALC BMI ABV UP PARAM F/U: HCPCS | Performed by: FAMILY MEDICINE

## 2020-10-09 PROCEDURE — G8484 FLU IMMUNIZE NO ADMIN: HCPCS | Performed by: FAMILY MEDICINE

## 2020-10-09 PROCEDURE — 99214 OFFICE O/P EST MOD 30 MIN: CPT | Performed by: FAMILY MEDICINE

## 2020-10-09 PROCEDURE — 1036F TOBACCO NON-USER: CPT | Performed by: FAMILY MEDICINE

## 2020-10-09 PROCEDURE — G8427 DOCREV CUR MEDS BY ELIG CLIN: HCPCS | Performed by: FAMILY MEDICINE

## 2020-10-09 RX ORDER — TRANDOLAPRIL AND VERAPAMIL HYDROCHLORIDE 1; 240 MG/1; MG/1
1 TABLET, FILM COATED, EXTENDED RELEASE ORAL DAILY
Qty: 30 TABLET | Refills: 3 | Status: SHIPPED | OUTPATIENT
Start: 2020-10-09 | End: 2021-02-09

## 2020-10-09 RX ORDER — HYDROXYZINE PAMOATE 25 MG/1
25 CAPSULE ORAL NIGHTLY
Qty: 30 CAPSULE | Refills: 2 | Status: SHIPPED | OUTPATIENT
Start: 2020-10-09 | End: 2021-04-08

## 2020-10-09 NOTE — PROGRESS NOTES
Subjective:      Patient ID: Riley Oliveira is a 28 y.o. female. Blood pressure (!) 142/90, pulse 64, temperature 98.2 °F (36.8 °C), temperature source Temporal, height 5' 3\" (1.6 m), weight 173 lb (78.5 kg), last menstrual period 09/14/2020, SpO2 98 %, not currently breastfeeding. HPI   Chief Complaint   Patient presents with    Hypertension     f/u htn and thyroid check     Still working in [employer] most the time. Home for a brief time. She is not : last name is now DIAN, but she has not changed name legally yet. They are planning to remain in [place]. HTN: taking veramapil (which also helps headaches). She checks bp at work: usually 125-150/. No chest pains, dizziness, heart palpitations, dyspnea, lightheadedness, worsening edema. No hx of CAD, TIA, CVA or PVD. Last renal function test:   Lab Results   Component Value Date     01/25/2020    K 4.4 01/25/2020    BUN 9 01/25/2020    CREATININE 0.8 01/25/2020          Chronic post traumatic headaches: using vistaril nightly for headache prevention. She has been out of this and she feels headaches returning. Also uses ambien for sleep chronically. (has tried to stop mult times in past). Feels she is getting great sleep currently and feels clear headed and good energy. She is getting 8 hrs nightly. Thyroid: on synthroid 100. Feels good on this. Lab Results   Component Value Date    TSH 2.58 01/25/2020    TSH 0.38 12/22/2017    TSH 2.61 06/21/2017        Lab Results   Component Value Date    CHOL 163 01/25/2020    TRIG 80 01/25/2020    HDL 56 01/25/2020    LDLCALC 91 01/25/2020     Lab Results   Component Value Date    ALT 10 01/25/2020    AST 16 01/25/2020        Struggling to lose weight: decreasing portions, has not been exercising, but is on her feet all day. Seeking to decrease sweets.     Patient Active Problem List   Diagnosis    Fibromyalgia    Insomnia    TBI (traumatic brain injury) ([facility] [address]    Hypothyroid- comment: congratulated on nonsmoking status   Substance Use Topics    Alcohol use: No     Alcohol/week: 0.0 standard drinks    Drug use: No        Review of Systems As above     Objective:   Physical Exam  Vitals signs and nursing note reviewed. Constitutional:       Appearance: Normal appearance. She is well-developed. Neck:      Musculoskeletal: Normal range of motion and neck supple. Thyroid: No thyromegaly. Vascular: No carotid bruit. Cardiovascular:      Rate and Rhythm: Normal rate and regular rhythm. Pulses: Normal pulses. Heart sounds: Normal heart sounds. No murmur. Pulmonary:      Effort: Pulmonary effort is normal. No respiratory distress. Breath sounds: Normal breath sounds. No wheezing or rales. Musculoskeletal: Normal range of motion. Right lower leg: No edema. Left lower leg: No edema. Skin:     General: Skin is warm and dry. Neurological:      General: No focal deficit present. Mental Status: She is alert and oriented to person, place, and time. Mental status is at baseline. Psychiatric:         Mood and Affect: Mood normal.         Behavior: Behavior normal.         Thought Content: Thought content normal.         Judgment: Judgment normal.         Assessment:      1. Essential hypertension  - bp high. Add trandalopril to verapamil (Tarka)  Recheck bmp in 1-2 mo    2. Hypothyroidism due to Hashimoto's thyroiditis  - Clinically euthyroid; continue current dose of Levothroid. 3. Chronic post-traumatic headache, not intractable  - refilled vistiril    4. Primary insomnia  - Stable; continue ambien and hydroxyzine.            Plan:      F/u bp check 2 mo        Peggy Tinoco MD

## 2020-12-28 ENCOUNTER — OFFICE VISIT (OUTPATIENT)
Dept: FAMILY MEDICINE CLINIC | Age: 35
End: 2020-12-28
Payer: COMMERCIAL

## 2020-12-28 VITALS
OXYGEN SATURATION: 98 % | HEIGHT: 63 IN | DIASTOLIC BLOOD PRESSURE: 70 MMHG | HEART RATE: 86 BPM | BODY MASS INDEX: 30.65 KG/M2 | TEMPERATURE: 97.2 F | SYSTOLIC BLOOD PRESSURE: 116 MMHG | WEIGHT: 173 LBS

## 2020-12-28 DIAGNOSIS — K75.81 NASH (NONALCOHOLIC STEATOHEPATITIS): ICD-10-CM

## 2020-12-28 DIAGNOSIS — E06.3 HYPOTHYROIDISM DUE TO HASHIMOTO'S THYROIDITIS: ICD-10-CM

## 2020-12-28 DIAGNOSIS — E03.8 HYPOTHYROIDISM DUE TO HASHIMOTO'S THYROIDITIS: ICD-10-CM

## 2020-12-28 DIAGNOSIS — I10 ESSENTIAL HYPERTENSION: ICD-10-CM

## 2020-12-28 PROBLEM — R10.9 CHRONIC ABDOMINAL PAIN: Status: ACTIVE | Noted: 2020-12-28

## 2020-12-28 PROBLEM — G89.29 CHRONIC ABDOMINAL PAIN: Status: ACTIVE | Noted: 2020-12-28

## 2020-12-28 LAB
A/G RATIO: 2 (ref 1.1–2.2)
ALBUMIN SERPL-MCNC: 4.8 G/DL (ref 3.4–5)
ALP BLD-CCNC: 81 U/L (ref 40–129)
ALT SERPL-CCNC: 12 U/L (ref 10–40)
ANION GAP SERPL CALCULATED.3IONS-SCNC: 12 MMOL/L (ref 3–16)
AST SERPL-CCNC: 18 U/L (ref 15–37)
BILIRUB SERPL-MCNC: 0.5 MG/DL (ref 0–1)
BUN BLDV-MCNC: 13 MG/DL (ref 7–20)
CALCIUM SERPL-MCNC: 9.4 MG/DL (ref 8.3–10.6)
CHLORIDE BLD-SCNC: 104 MMOL/L (ref 99–110)
CO2: 23 MMOL/L (ref 21–32)
CREAT SERPL-MCNC: 0.9 MG/DL (ref 0.6–1.1)
GFR AFRICAN AMERICAN: >60
GFR NON-AFRICAN AMERICAN: >60
GLOBULIN: 2.4 G/DL
GLUCOSE BLD-MCNC: 92 MG/DL (ref 70–99)
POTASSIUM SERPL-SCNC: 4.7 MMOL/L (ref 3.5–5.1)
SODIUM BLD-SCNC: 139 MMOL/L (ref 136–145)
TOTAL PROTEIN: 7.2 G/DL (ref 6.4–8.2)
TSH SERPL DL<=0.05 MIU/L-ACNC: 3.04 UIU/ML (ref 0.27–4.2)

## 2020-12-28 PROCEDURE — G8417 CALC BMI ABV UP PARAM F/U: HCPCS | Performed by: FAMILY MEDICINE

## 2020-12-28 PROCEDURE — G8484 FLU IMMUNIZE NO ADMIN: HCPCS | Performed by: FAMILY MEDICINE

## 2020-12-28 PROCEDURE — 99214 OFFICE O/P EST MOD 30 MIN: CPT | Performed by: FAMILY MEDICINE

## 2020-12-28 PROCEDURE — G8427 DOCREV CUR MEDS BY ELIG CLIN: HCPCS | Performed by: FAMILY MEDICINE

## 2020-12-28 PROCEDURE — 1036F TOBACCO NON-USER: CPT | Performed by: FAMILY MEDICINE

## 2020-12-28 NOTE — PROGRESS NOTES
 JAIMEE (obstructive sleep apnea)- on CPAP since 4/15 Dr Shira Coburn [facility] hypertension    Chronic post-traumatic headache, not intractable    PCOS (polycystic ovarian syndrome) based on oligomenorrhea and high androgens    Cervical dysplasia, mild- colpo/bx 6/3/16    CANSECO (nonalcoholic steatohepatitis)- Dr Joceline Drake on Vit E      Body mass index is 30.65 kg/m². Wt Readings from Last 3 Encounters:   12/28/20 173 lb (78.5 kg)   10/09/20 173 lb (78.5 kg)   01/25/20 160 lb (72.6 kg)      BP Readings from Last 3 Encounters:   12/28/20 116/70   10/09/20 (!) 138/92   01/25/20 110/76      Current Outpatient Medications   Medication Sig Dispense Refill    zolpidem (AMBIEN) 10 MG tablet TAKE 1 TABLET BY MOUTH NIGHTLY AS NEEDED FOR SLEEP 30 tablet 2    hydrOXYzine (VISTARIL) 25 MG capsule Take 1 capsule by mouth nightly 30 capsule 2    trandolapril-verapamil (TARKA) 1-240 MG per extended release tablet Take 1 tablet by mouth daily 30 tablet 3    ondansetron (ZOFRAN ODT) 8 MG TBDP disintegrating tablet Place 1 tablet under the tongue every 8 hours as needed (with migraine headache, take 20 min prior to Imitrex) 30 tablet 1    levothyroxine (SYNTHROID) 100 MCG tablet Take 1 tablet by mouth Daily 90 tablet 3    Ascorbic Acid (VITAMIN C) 250 MG CHEW Take by mouth 2 times daily      Riboflavin (VITAMIN B-2 PO) Take 4 tablets by mouth every evening      SUMAtriptan (IMITREX) 100 MG tablet Take 1 tablet by mouth once as needed for Migraine 9 tablet 5    EPINEPHrine (EPIPEN 2-NYLA) 0.3 MG/0.3ML SOAJ injection Inject 0.3 mLs into the muscle once as needed (anaphylaxis) 1 each 1     No current facility-administered medications for this visit.        Immunization History   Administered Date(s) Administered    Tdap (Boostrix, Adacel) 06/15/2011        Social History     Tobacco Use    Smoking status: Never Smoker    Smokeless tobacco: Never Used    Tobacco comment: congratulated on nonsmoking status Substance Use Topics    Alcohol use: No     Alcohol/week: 0.0 standard drinks    Drug use: No        Review of Systems No chest pains, dizziness, heart palpitations, dyspnea, lightheadedness, worsening edema. Objective:   Physical Exam  Vitals signs and nursing note reviewed. Constitutional:       Appearance: Normal appearance. She is well-developed. Neck:      Musculoskeletal: Normal range of motion and neck supple. Thyroid: No thyromegaly. Vascular: No carotid bruit. Cardiovascular:      Rate and Rhythm: Normal rate and regular rhythm. Pulses: Normal pulses. Heart sounds: Normal heart sounds. No murmur. Pulmonary:      Effort: Pulmonary effort is normal. No respiratory distress. Breath sounds: Normal breath sounds. No wheezing or rales. Abdominal:      General: Abdomen is flat. Bowel sounds are normal. There is no distension. Palpations: Abdomen is soft. There is no mass. Tenderness: There is abdominal tenderness (mild diffuse tenderness. ). There is no right CVA tenderness, left CVA tenderness, guarding or rebound. Hernia: No hernia is present. Musculoskeletal: Normal range of motion. Right lower leg: No edema. Left lower leg: No edema. Skin:     General: Skin is warm and dry. Neurological:      General: No focal deficit present. Mental Status: She is alert and oriented to person, place, and time. Mental status is at baseline. Psychiatric:         Mood and Affect: Mood normal.         Behavior: Behavior normal.         Thought Content: Thought content normal.         Judgment: Judgment normal.         Assessment:      1. Essential hypertension  - Blood pressure is at goal on current therapy; continue meds and monitoring. Regular physical activity and healthy diet (low sodium, multiple servings of produce daily) was recommended. Renal function to be assessed yearly. - Comprehensive Metabolic Panel; Future    2.  Chronic abdominal pain - cause is unclear. Suspect related to constipation. Try probiotic (Align) + metamucil. Consider PPI if this does not help. 3. Hypothyroidism due to Hashimoto's thyroiditis  - retest TSH due to abd pain and constiaption  - TSH without Reflex; Future    4. CANSECO (nonalcoholic steatohepatitis)- Dr Joy Seek on Vit E  - retest LFT's. - Comprehensive Metabolic Panel; Future          Plan:      F/u 3-6 mo based on how she does.         Chanel Whitley MD

## 2021-01-11 RX ORDER — EPINEPHRINE 0.3 MG/.3ML
0.3 INJECTION SUBCUTANEOUS
Qty: 1 EACH | Refills: 1 | Status: SHIPPED | OUTPATIENT
Start: 2021-01-11 | End: 2021-07-09 | Stop reason: SDUPTHER

## 2021-02-09 RX ORDER — TRANDOLAPRIL AND VERAPAMIL HYDROCHLORIDE 1; 240 MG/1; MG/1
TABLET, FILM COATED, EXTENDED RELEASE ORAL
Qty: 30 TABLET | Refills: 2 | Status: SHIPPED | OUTPATIENT
Start: 2021-02-09 | End: 2021-03-26 | Stop reason: DRUGHIGH

## 2021-03-26 ENCOUNTER — OFFICE VISIT (OUTPATIENT)
Dept: FAMILY MEDICINE CLINIC | Age: 36
End: 2021-03-26
Payer: COMMERCIAL

## 2021-03-26 VITALS
SYSTOLIC BLOOD PRESSURE: 120 MMHG | BODY MASS INDEX: 31.18 KG/M2 | OXYGEN SATURATION: 98 % | TEMPERATURE: 97.7 F | HEART RATE: 72 BPM | WEIGHT: 176 LBS | HEIGHT: 63 IN | DIASTOLIC BLOOD PRESSURE: 82 MMHG

## 2021-03-26 DIAGNOSIS — J34.89 INTERNAL NASAL LESION: ICD-10-CM

## 2021-03-26 DIAGNOSIS — I10 ESSENTIAL HYPERTENSION: Primary | ICD-10-CM

## 2021-03-26 PROCEDURE — 1036F TOBACCO NON-USER: CPT | Performed by: FAMILY MEDICINE

## 2021-03-26 PROCEDURE — G8484 FLU IMMUNIZE NO ADMIN: HCPCS | Performed by: FAMILY MEDICINE

## 2021-03-26 PROCEDURE — 99214 OFFICE O/P EST MOD 30 MIN: CPT | Performed by: FAMILY MEDICINE

## 2021-03-26 PROCEDURE — G8417 CALC BMI ABV UP PARAM F/U: HCPCS | Performed by: FAMILY MEDICINE

## 2021-03-26 PROCEDURE — G8427 DOCREV CUR MEDS BY ELIG CLIN: HCPCS | Performed by: FAMILY MEDICINE

## 2021-03-26 RX ORDER — TRANDOLAPRIL AND VERAPAMIL HYDROCHLORIDE 2; 240 MG/1; MG/1
1 TABLET, FILM COATED, EXTENDED RELEASE ORAL DAILY
Qty: 30 TABLET | Refills: 3 | Status: SHIPPED | OUTPATIENT
Start: 2021-03-26 | End: 2021-07-09 | Stop reason: DRUGHIGH

## 2021-03-26 ASSESSMENT — PATIENT HEALTH QUESTIONNAIRE - PHQ9
2. FEELING DOWN, DEPRESSED OR HOPELESS: 0
SUM OF ALL RESPONSES TO PHQ9 QUESTIONS 1 & 2: 0
SUM OF ALL RESPONSES TO PHQ QUESTIONS 1-9: 0
SUM OF ALL RESPONSES TO PHQ QUESTIONS 1-9: 0

## 2021-03-26 NOTE — PROGRESS NOTES
10/09/20 173 lb (78.5 kg)       BP Readings from Last 3 Encounters:   03/26/21 120/82   12/28/20 116/70   10/09/20 (!) 138/92       Allergies   Allergen Reactions    Hydrocodone-Acetaminophen Nausea And Vomiting     Projectile vomiting    Peanut-Containing Drug Products Anaphylaxis    Peanuts [Peanut Oil] Anaphylaxis    Macrobid [Nitrofurantoin Monohydrate Macrocrystals] Hives       Prior to Visit Medications    Medication Sig Taking? Authorizing Provider   trandolapril-verapamil Zhanna Tavera) 1-240 MG per extended release tablet Take 1 tablet by mouth once daily Yes Roseanne Gaines MD   hydrOXYzine (VISTARIL) 25 MG capsule Take 1 capsule by mouth nightly Yes Roseanne Gaines MD   ondansetron (ZOFRAN ODT) 8 MG TBDP disintegrating tablet Place 1 tablet under the tongue every 8 hours as needed (with migraine headache, take 20 min prior to Imitrex) Yes Roseanne Gaines MD   levothyroxine (SYNTHROID) 100 MCG tablet Take 1 tablet by mouth Daily Yes Roseanne Gaines MD   Ascorbic Acid (VITAMIN C) 250 MG CHEW Take by mouth 2 times daily Yes Historical Provider, MD   Riboflavin (VITAMIN B-2 PO) Take 4 tablets by mouth every evening Yes Historical Provider, MD   EPINEPHrine (EPIPEN 2-NYLA) 0.3 MG/0.3ML SOAJ injection Inject 0.3 mLs into the muscle once as needed (anaphylaxis)  Roseanne Gaines MD   SUMAtriptan (IMITREX) 100 MG tablet Take 1 tablet by mouth once as needed for Migraine  Roseanne Gaines MD        Social History     Tobacco Use    Smoking status: Never Smoker    Smokeless tobacco: Never Used    Tobacco comment: congratulated on nonsmoking status   Substance Use Topics    Alcohol use: No     Alcohol/week: 0.0 standard drinks    Drug use: No       Review of Systems No chest pains, dizziness, heart palpitations, dyspnea, lightheadedness, worsening edema. Physical Exam  Vitals signs and nursing note reviewed. Constitutional:       Appearance: Normal appearance.  She is well-developed. HENT:      Nose: Nose normal.   Neck:      Musculoskeletal: Normal range of motion and neck supple. Thyroid: No thyromegaly. Vascular: No carotid bruit. Cardiovascular:      Rate and Rhythm: Normal rate and regular rhythm. Pulses: Normal pulses. Heart sounds: Normal heart sounds. No murmur. Pulmonary:      Effort: Pulmonary effort is normal. No respiratory distress. Breath sounds: Normal breath sounds. No wheezing or rales. Musculoskeletal: Normal range of motion. Right lower leg: No edema. Left lower leg: No edema. Skin:     General: Skin is warm and dry. Neurological:      General: No focal deficit present. Mental Status: She is alert and oriented to person, place, and time. Mental status is at baseline. Psychiatric:         Mood and Affect: Mood normal.         Behavior: Behavior normal.         Thought Content: Thought content normal.         Judgment: Judgment normal.          ASSESSMENT/PLAN:    1. Essential hypertension  - incr Tarka to 1/240 based on her high home bp's    2. Internal nasal lesion  - I don't see an infected lesion on inspection. But this is likely a mild staph infection. Use bactroban ointment and refrain from scratching it. Follow up: 3 mo    An  ClinicIQignature was used to authenticate this note.     --Sharron Adams MD on 3/26/2021 at 4:01 PM

## 2021-04-08 DIAGNOSIS — F51.01 PRIMARY INSOMNIA: ICD-10-CM

## 2021-04-08 RX ORDER — HYDROXYZINE PAMOATE 25 MG/1
25 CAPSULE ORAL NIGHTLY
Qty: 90 CAPSULE | Refills: 0 | Status: SHIPPED | OUTPATIENT
Start: 2021-04-08 | End: 2021-07-08

## 2021-04-09 RX ORDER — ZOLPIDEM TARTRATE 10 MG/1
TABLET ORAL
Qty: 30 TABLET | Refills: 0 | Status: SHIPPED | OUTPATIENT
Start: 2021-04-09 | End: 2021-06-03

## 2021-05-04 RX ORDER — LEVOTHYROXINE SODIUM 100 UG/1
TABLET ORAL
Qty: 90 TABLET | Refills: 0 | Status: SHIPPED | OUTPATIENT
Start: 2021-05-04 | End: 2021-07-09 | Stop reason: SDUPTHER

## 2021-07-09 ENCOUNTER — VIRTUAL VISIT (OUTPATIENT)
Dept: FAMILY MEDICINE CLINIC | Age: 36
End: 2021-07-09
Payer: MEDICAID

## 2021-07-09 DIAGNOSIS — G44.329 CHRONIC POST-TRAUMATIC HEADACHE, NOT INTRACTABLE: ICD-10-CM

## 2021-07-09 DIAGNOSIS — J20.9 ACUTE BRONCHITIS, UNSPECIFIED ORGANISM: ICD-10-CM

## 2021-07-09 DIAGNOSIS — I10 ESSENTIAL HYPERTENSION: Primary | ICD-10-CM

## 2021-07-09 PROCEDURE — 99214 OFFICE O/P EST MOD 30 MIN: CPT | Performed by: FAMILY MEDICINE

## 2021-07-09 PROCEDURE — G8427 DOCREV CUR MEDS BY ELIG CLIN: HCPCS | Performed by: FAMILY MEDICINE

## 2021-07-09 RX ORDER — LEVOTHYROXINE SODIUM 0.1 MG/1
TABLET ORAL
Qty: 90 TABLET | Refills: 1 | Status: SHIPPED | OUTPATIENT
Start: 2021-07-09

## 2021-07-09 RX ORDER — TRANDOLAPRIL AND VERAPAMIL HYDROCHLORIDE 4; 240 MG/1; MG/1
1 TABLET, FILM COATED, EXTENDED RELEASE ORAL DAILY
Qty: 30 TABLET | Refills: 3 | Status: SHIPPED | OUTPATIENT
Start: 2021-07-09

## 2021-07-09 RX ORDER — EPINEPHRINE 0.3 MG/.3ML
0.3 INJECTION SUBCUTANEOUS
Qty: 1 EACH | Refills: 1 | Status: SHIPPED | OUTPATIENT
Start: 2021-07-09 | End: 2021-07-09

## 2021-07-09 RX ORDER — TRANDOLAPRIL AND VERAPAMIL HYDROCHLORIDE 2; 240 MG/1; MG/1
1 TABLET, FILM COATED, EXTENDED RELEASE ORAL DAILY
Qty: 90 TABLET | Refills: 1 | Status: CANCELLED | OUTPATIENT
Start: 2021-07-09

## 2021-07-09 RX ORDER — ALBUTEROL SULFATE 90 UG/1
2 AEROSOL, METERED RESPIRATORY (INHALATION) 4 TIMES DAILY PRN
Qty: 1 INHALER | Refills: 0 | Status: SHIPPED | OUTPATIENT
Start: 2021-07-09

## 2021-07-09 NOTE — PROGRESS NOTES
2021    TELEHEALTH EVALUATION -- Audio/Visual (During [facility]-98 public health emergency)    HPI:    Oz Doan (:  1985) has requested an audio/video evaluation for the following concern(s):    Chief Complaint   Patient presents with    Follow-up     f/u check   - has dry cough - 1 week      Fu HTN  Taking tarka as below, no SE's  She has been testing at home on cuff and at work. Seeing 150/90 fairly regularly. occ lower, but not much. occ has headaches when in the sun. Less headaches in general.  About 1 migraine every 2 weeks. (imitrex+zofran works well for those.)    No chest pains, dizziness, heart palpitations, dyspnea, lightheadedness, worsening edema. Last renal function test:   Lab Results   Component Value Date     2020    K 4.7 2020    BUN 13 2020    CREATININE 0.9 2020     CrCl cannot be calculated (Patient's most recent lab result is older than the maximum 120 days allowed. ). 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 No headaches. The cough is just HARD. using dayquil/nyquil with minimal help  Used old albuterol inhaler and that did help her symptoms. Does not have hx of asthma. No ill contacts. Has not been vaccinated against coronavirus. No recent synthroid dose adjustment. Feels OK on this dose. Lab Results   Component Value Date    TSH 3.04 2020    TSH 2.58 2020    TSH 0.38 2017        planning to move to [facility] soon.  (for [employer])      Review of Systems As above     Patient Active Problem List   Diagnosis    Fibromyalgia    Insomnia    [facility]traumatic [facility]) ([facility] [address]    Hypothyroid- autoimmune; 3/15.     Vitamin D deficiency-13ng/mL (3/24/15)    JAIMEE (obstructive sleep apnea)- on CPAP since 4/15 Dr Diego Burleson at [facility] 21 hypertension    Chronic post-traumatic headache, not intractable    PCOS (polycystic ovarian syndrome) based on oligomenorrhea and high androgens    Cervical dysplasia, mild- colpo/bx 6/3/16    CANSECO (nonalcoholic steatohepatitis)- Dr Keiry Samuel on Vit E    Chronic abdominal pain        Prior to Visit Medications    Medication Sig Taking? Authorizing Provider   zolpidem (AMBIEN) 10 MG tablet TAKE 1 TABLET BY MOUTH NIGHTLY AS NEEDED FOR SLEEP Yes Rose Garcia MD   hydrOXYzine (VISTARIL) 25 MG capsule TAKE 1 CAPSULE BY MOUTH NIGHTLY Yes Rose Garcia MD   EUTHYROX 100 MCG tablet Take 1 tablet by mouth once daily Yes Rose Garcia MD   trandolapril-verapamil Creig Meabrent) 2-240 MG per extended release tablet Take 1 tablet by mouth daily Yes Rose Garcia MD   ondansetron (ZOFRAN ODT) 8 MG TBDP disintegrating tablet Place 1 tablet under the tongue every 8 hours as needed (with migraine headache, take 20 min prior to Imitrex) Yes Rose Garcia MD   Ascorbic Acid (VITAMIN C) 250 MG CHEW Take by mouth 2 times daily Yes Historical Provider, MD   Riboflavin (VITAMIN B-2 PO) Take 4 tablets by mouth every evening Yes Historical Provider, MD   EPINEPHrine (EPIPEN 2-NYLA) 0.3 MG/0.3ML SOAJ injection Inject 0.3 mLs into the muscle once as needed (anaphylaxis)  Rose Garcia MD   SUMAtriptan (IMITREX) 100 MG tablet Take 1 tablet by mouth once as needed for Migraine  Rose Garcia MD       Social History     Tobacco Use    Smoking status: Never Smoker    Smokeless tobacco: Never Used    Tobacco comment: congratulated on nonsmoking status   Substance Use Topics    Alcohol use: No     Alcohol/week: 0.0 standard drinks    Drug use: No            PHYSICAL EXAMINATION:  Physical Exam  Constitutional:       General: She is not in acute distress. Appearance: Normal appearance. She is not ill-appearing. Pulmonary:      Effort: Pulmonary effort is normal.   Skin:     General: Skin is warm. Neurological:      General: No focal deficit present. Mental Status: She is alert and oriented to person, place, and time. Mental status is at baseline. Psychiatric:         Mood and Affect: Mood normal.         Behavior: Behavior normal.         Thought Content: Thought content normal.         Judgment: Judgment normal.          ASSESSMENT/PLAN:    1. Essential hypertension  - bp still reported to be high. Plan to increase Tarka to 4/240.  - continue home monitoring. Will need bp recheck and labs- is moving to [place], so will need to fine PCP there rather soon    2. Acute bronchitis, unspecified organism  - no worrisome symtpoms. Likely viral .  Ordered albuterol for PRN use.  - has not had covid vaccine- I asked her to isolate until 10 days from onset of sx have passed, just to be safe. 3. Chronic post-traumatic headache, not intractable  - HA's improved with verapamil, responsive to imitrex. Continue this plan for now. Return in about 2 months (around 9/9/2021) for follow up HTN, OK for Virtual Video Visit. Tigist Chandra is a 28 y.o. female being evaluated by a Virtual Visit (video visit) encounter to address concerns as mentioned above. A caregiver was present when appropriate. Due to this being a TeleHealth encounter (During ENLOV-82 public health emergency), evaluation of the following organ systems was limited: Vitals/Constitutional/EENT/Resp/CV/GI//MS/Neuro/Skin/Heme-Lymph-Imm. Pursuant to the emergency declaration under the [facility], [address] [place] [address] authority and the [employer] Act, this Virtual Visit was conducted with patient's (and/or legal guardian's) consent, to reduce the patient's risk of exposure to COVID-19 and provide necessary medical care. The patient (and/or legal guardian) has also been advised to contact this office for worsening conditions or problems, and seek emergency medical treatment and/or call 911 if deemed necessary.      Patient identification was verified at the start of the visit: Yes    Services were provided through a video synchronous discussion virtually to substitute for in-person clinic visit. Patient and provider were located at their individual homes. --Delma Witt MD on 7/9/2021 at 12:35 PM    An electronic signature was used to authenticate this note.

## 2021-09-10 RX ORDER — HYDROXYZINE PAMOATE 25 MG/1
CAPSULE ORAL
Qty: 60 CAPSULE | Refills: 0 | Status: SHIPPED | OUTPATIENT
Start: 2021-09-10